# Patient Record
Sex: FEMALE | Race: WHITE | Employment: FULL TIME | URBAN - METROPOLITAN AREA
[De-identification: names, ages, dates, MRNs, and addresses within clinical notes are randomized per-mention and may not be internally consistent; named-entity substitution may affect disease eponyms.]

---

## 2017-02-06 RX ORDER — DEXTROAMPHETAMINE SACCHARATE, AMPHETAMINE ASPARTATE, DEXTROAMPHETAMINE SULFATE AND AMPHETAMINE SULFATE 5; 5; 5; 5 MG/1; MG/1; MG/1; MG/1
TABLET ORAL
Qty: 105 TABLET | Refills: 0 | Status: CANCELLED | OUTPATIENT
Start: 2017-02-06

## 2017-02-06 RX ORDER — DEXTROAMPHETAMINE SACCHARATE, AMPHETAMINE ASPARTATE, DEXTROAMPHETAMINE SULFATE AND AMPHETAMINE SULFATE 5; 5; 5; 5 MG/1; MG/1; MG/1; MG/1
TABLET ORAL
Qty: 105 TABLET | Refills: 0 | Status: SHIPPED | OUTPATIENT
Start: 2017-04-06 | End: 2017-05-03

## 2017-02-06 RX ORDER — DEXTROAMPHETAMINE SACCHARATE, AMPHETAMINE ASPARTATE, DEXTROAMPHETAMINE SULFATE AND AMPHETAMINE SULFATE 5; 5; 5; 5 MG/1; MG/1; MG/1; MG/1
TABLET ORAL
Qty: 105 TABLET | Refills: 0 | Status: SHIPPED | OUTPATIENT
Start: 2017-03-06 | End: 2017-04-05

## 2017-02-06 RX ORDER — DEXTROAMPHETAMINE SACCHARATE, AMPHETAMINE ASPARTATE, DEXTROAMPHETAMINE SULFATE AND AMPHETAMINE SULFATE 5; 5; 5; 5 MG/1; MG/1; MG/1; MG/1
TABLET ORAL
Qty: 105 TABLET | Refills: 0 | Status: SHIPPED | OUTPATIENT
Start: 2017-02-06 | End: 2017-03-05

## 2017-02-06 NOTE — TELEPHONE ENCOUNTER
From: Adri Sykes  To: Karthik Herrmann NP  Sent: 2/5/2017 11:41 AM CST  Subject: Medication Renewal Request    Original authorizing provider: AUDRA Fitzgerald would like a refill of the following medications:  amphetamine-dextroam

## 2017-02-06 NOTE — TELEPHONE ENCOUNTER
From: Jaylene Baxter  To: Meño Welch NP  Sent: 2/5/2017 11:44 AM CST  Subject: Medication Renewal Request    Original authorizing provider: AUDRA Nevarez would like a refill of the following medications:  amphetamine-dextroam

## 2017-02-08 RX ORDER — DEXTROAMPHETAMINE SACCHARATE, AMPHETAMINE ASPARTATE, DEXTROAMPHETAMINE SULFATE AND AMPHETAMINE SULFATE 5; 5; 5; 5 MG/1; MG/1; MG/1; MG/1
TABLET ORAL
Qty: 105 TABLET | Refills: 0 | Status: CANCELLED | OUTPATIENT
Start: 2017-02-08 | End: 2017-03-07

## 2017-02-10 ENCOUNTER — TELEPHONE (OUTPATIENT)
Dept: INTERNAL MEDICINE CLINIC | Facility: CLINIC | Age: 37
End: 2017-02-10

## 2017-02-10 RX ORDER — ROSUVASTATIN CALCIUM 5 MG/1
5 TABLET, COATED ORAL NIGHTLY
Qty: 90 TABLET | Refills: 0 | Status: SHIPPED | OUTPATIENT
Start: 2017-02-10 | End: 2017-05-12

## 2017-04-30 ENCOUNTER — TELEPHONE (OUTPATIENT)
Dept: INTERNAL MEDICINE CLINIC | Facility: CLINIC | Age: 37
End: 2017-04-30

## 2017-05-03 ENCOUNTER — TELEPHONE (OUTPATIENT)
Dept: INTERNAL MEDICINE CLINIC | Facility: CLINIC | Age: 37
End: 2017-05-03

## 2017-05-03 RX ORDER — DEXTROAMPHETAMINE SACCHARATE, AMPHETAMINE ASPARTATE, DEXTROAMPHETAMINE SULFATE AND AMPHETAMINE SULFATE 5; 5; 5; 5 MG/1; MG/1; MG/1; MG/1
TABLET ORAL
Qty: 105 TABLET | Refills: 0 | Status: SHIPPED | OUTPATIENT
Start: 2017-05-03 | End: 2017-06-01

## 2017-05-12 RX ORDER — ROSUVASTATIN CALCIUM 5 MG/1
5 TABLET, COATED ORAL NIGHTLY
Qty: 30 TABLET | Refills: 0 | Status: SHIPPED | OUTPATIENT
Start: 2017-05-12 | End: 2017-05-31

## 2017-05-12 NOTE — TELEPHONE ENCOUNTER
From: Pola Cosme  To: Alissa Atwood NP  Sent: 5/11/2017 10:47 PM CDT  Subject: Medication Renewal Request    Original authorizing provider: ADURA Bladeras would like a refill of the following medications:  Rosuvastatin Calciu

## 2017-05-31 ENCOUNTER — OFFICE VISIT (OUTPATIENT)
Dept: INTERNAL MEDICINE CLINIC | Facility: CLINIC | Age: 37
End: 2017-05-31

## 2017-05-31 VITALS
WEIGHT: 221.5 LBS | HEART RATE: 84 BPM | SYSTOLIC BLOOD PRESSURE: 122 MMHG | OXYGEN SATURATION: 99 % | BODY MASS INDEX: 39 KG/M2 | RESPIRATION RATE: 16 BRPM | DIASTOLIC BLOOD PRESSURE: 78 MMHG | TEMPERATURE: 99 F

## 2017-05-31 DIAGNOSIS — E78.5 HYPERLIPIDEMIA, UNSPECIFIED HYPERLIPIDEMIA TYPE: ICD-10-CM

## 2017-05-31 DIAGNOSIS — E66.9 OBESITY, UNSPECIFIED: ICD-10-CM

## 2017-05-31 DIAGNOSIS — G43.909 MIGRAINE WITHOUT STATUS MIGRAINOSUS, NOT INTRACTABLE, UNSPECIFIED MIGRAINE TYPE: ICD-10-CM

## 2017-05-31 DIAGNOSIS — E03.9 HYPOTHYROIDISM, UNSPECIFIED TYPE: Primary | ICD-10-CM

## 2017-05-31 DIAGNOSIS — F90.9 ATTENTION DEFICIT HYPERACTIVITY DISORDER (ADHD), UNSPECIFIED ADHD TYPE: ICD-10-CM

## 2017-05-31 PROCEDURE — 99214 OFFICE O/P EST MOD 30 MIN: CPT | Performed by: FAMILY MEDICINE

## 2017-05-31 RX ORDER — DEXTROAMPHETAMINE SACCHARATE, AMPHETAMINE ASPARTATE, DEXTROAMPHETAMINE SULFATE AND AMPHETAMINE SULFATE 5; 5; 5; 5 MG/1; MG/1; MG/1; MG/1
TABLET ORAL
Qty: 105 TABLET | Refills: 0 | Status: SHIPPED | OUTPATIENT
Start: 2017-05-31 | End: 2017-06-30

## 2017-05-31 RX ORDER — HYDROCODONE BITARTRATE AND ACETAMINOPHEN 7.5; 325 MG/1; MG/1
1 TABLET ORAL EVERY 6 HOURS PRN
Qty: 20 TABLET | Refills: 0 | Status: SHIPPED | OUTPATIENT
Start: 2017-05-31 | End: 2018-01-30 | Stop reason: ALTCHOICE

## 2017-05-31 RX ORDER — DEXTROAMPHETAMINE SACCHARATE, AMPHETAMINE ASPARTATE, DEXTROAMPHETAMINE SULFATE AND AMPHETAMINE SULFATE 5; 5; 5; 5 MG/1; MG/1; MG/1; MG/1
TABLET ORAL
Qty: 105 TABLET | Refills: 0 | Status: SHIPPED | OUTPATIENT
Start: 2017-07-31 | End: 2017-08-24

## 2017-05-31 RX ORDER — RIZATRIPTAN BENZOATE 10 MG/1
TABLET ORAL
Qty: 9 TABLET | Refills: 1 | Status: SHIPPED | OUTPATIENT
Start: 2017-05-31 | End: 2018-12-03

## 2017-05-31 RX ORDER — LEVOTHYROXINE SODIUM 0.12 MG/1
TABLET ORAL
Qty: 30 TABLET | Refills: 5 | Status: SHIPPED | OUTPATIENT
Start: 2017-05-31 | End: 2018-01-08 | Stop reason: DRUGHIGH

## 2017-05-31 RX ORDER — ROSUVASTATIN CALCIUM 5 MG/1
5 TABLET, COATED ORAL NIGHTLY
Qty: 30 TABLET | Refills: 5 | Status: SHIPPED | OUTPATIENT
Start: 2017-05-31 | End: 2018-07-11

## 2017-05-31 RX ORDER — DEXTROAMPHETAMINE SACCHARATE, AMPHETAMINE ASPARTATE, DEXTROAMPHETAMINE SULFATE AND AMPHETAMINE SULFATE 5; 5; 5; 5 MG/1; MG/1; MG/1; MG/1
TABLET ORAL
Qty: 105 TABLET | Refills: 0 | Status: SHIPPED | OUTPATIENT
Start: 2017-06-30 | End: 2017-07-31

## 2017-05-31 NOTE — PROGRESS NOTES
CC:       Fiona Christianson is a 39year old female who presents for recheck on  hyperlipidemia. Currently asymptomatic, no chest pains, jaw pains, arm pains. No skin lesions.   No SOB on exertion or rest. Patient denies any myalgias or arthralgias on the Rfl: 1   Rosuvastatin Calcium (CRESTOR) 5 MG Oral Tab Take 1 tablet (5 mg total) by mouth nightly.  Disp: 30 tablet Rfl: 5   Levothyroxine Sodium 125 MCG Oral Tab TAKE 1 TABLET(125 MCG) BY MOUTH DAILY BEFORE BREAKFAST Disp: 30 tablet Rfl: 5   amphetamine-de enlarged. LUNGS: clear to auscultation  CARDIO: RRR without murmur  VS:no edema, peripheral pulses normal  GI: good BS's,no masses, HSM or tenderness  SKIN: no rashes,no suspicious lesions  EXTREMITIES: no cyanosis, clubbing   NEURO: Alert & Oriented x 3. Tab; Take one at the onset of your headache and repeat in 2 hours if needed. Dispense: 9 tablet; Refill: 1    5. Obesity  Pt referred to the weight loss clinic. Pt to continue to diet, exercise.     The patient indicates understanding of these issues and

## 2017-08-17 ENCOUNTER — PATIENT MESSAGE (OUTPATIENT)
Dept: INTERNAL MEDICINE CLINIC | Facility: CLINIC | Age: 37
End: 2017-08-17

## 2017-08-17 ENCOUNTER — TELEPHONE (OUTPATIENT)
Dept: INTERNAL MEDICINE CLINIC | Facility: CLINIC | Age: 37
End: 2017-08-17

## 2017-08-17 DIAGNOSIS — R79.89 ELEVATED TSH: Primary | ICD-10-CM

## 2017-08-17 RX ORDER — ALPRAZOLAM 0.25 MG/1
TABLET ORAL
Qty: 10 TABLET | Refills: 0 | Status: SHIPPED | OUTPATIENT
Start: 2017-08-17 | End: 2018-01-12

## 2017-08-17 NOTE — TELEPHONE ENCOUNTER
From: Marley Mg  To: Matty Up NP  Sent: 8/17/2017 10:24 AM CDT  Subject: Prescription Question    Can I get a refill on the generic Xanax? I am flying this Saturday and thought I had a couple left from the last RX but I do not. Thank you.

## 2017-08-17 NOTE — TELEPHONE ENCOUNTER
Labs reviewed that were drawn from an out side lab. TSH up. Pt needs to increase her Levothyroxine to 137 mcg one daily. #30 with one refill. Recheck TSH in 6-8 weeks.

## 2017-08-18 RX ORDER — LEVOTHYROXINE SODIUM 137 UG/1
137 TABLET ORAL
Qty: 30 TABLET | Refills: 1 | Status: SHIPPED | OUTPATIENT
Start: 2017-08-18 | End: 2017-11-09

## 2017-08-24 DIAGNOSIS — F90.9 ATTENTION DEFICIT HYPERACTIVITY DISORDER (ADHD), UNSPECIFIED ADHD TYPE: ICD-10-CM

## 2017-08-24 RX ORDER — DEXTROAMPHETAMINE SACCHARATE, AMPHETAMINE ASPARTATE, DEXTROAMPHETAMINE SULFATE AND AMPHETAMINE SULFATE 5; 5; 5; 5 MG/1; MG/1; MG/1; MG/1
TABLET ORAL
Qty: 105 TABLET | Refills: 0
Start: 2017-08-24 | End: 2017-08-24

## 2017-08-24 RX ORDER — DEXTROAMPHETAMINE SACCHARATE, AMPHETAMINE ASPARTATE, DEXTROAMPHETAMINE SULFATE AND AMPHETAMINE SULFATE 5; 5; 5; 5 MG/1; MG/1; MG/1; MG/1
TABLET ORAL
Qty: 105 TABLET | Refills: 0 | Status: SHIPPED | OUTPATIENT
Start: 2017-08-24 | End: 2017-09-12

## 2017-08-24 NOTE — TELEPHONE ENCOUNTER
From: Sahara Serna  Sent: 8/24/2017 1:26 PM CDT  Subject: Medication Renewal Request    Sahara Serna would like a refill of the following medications:  amphetamine-dextroamphetamine (ADDERALL) 20 MG Oral Tab Marylee Schaumann, NP]    Preferred pharm

## 2017-09-12 DIAGNOSIS — F90.9 ATTENTION DEFICIT HYPERACTIVITY DISORDER (ADHD), UNSPECIFIED ADHD TYPE: ICD-10-CM

## 2017-09-12 NOTE — TELEPHONE ENCOUNTER
From: Sahara Serna  Sent: 9/12/2017 2:33 PM CDT  Subject: Medication Renewal Request    Sahara Serna would like a refill of the following medications:  amphetamine-dextroamphetamine (ADDERALL) 20 MG Oral Tab Violet Mcdowell MD]    Preferred pharm

## 2017-09-13 ENCOUNTER — TELEPHONE (OUTPATIENT)
Dept: INTERNAL MEDICINE CLINIC | Facility: CLINIC | Age: 37
End: 2017-09-13

## 2017-09-13 RX ORDER — DEXTROAMPHETAMINE SACCHARATE, AMPHETAMINE ASPARTATE, DEXTROAMPHETAMINE SULFATE AND AMPHETAMINE SULFATE 5; 5; 5; 5 MG/1; MG/1; MG/1; MG/1
TABLET ORAL
Qty: 105 TABLET | Refills: 0 | Status: SHIPPED | OUTPATIENT
Start: 2017-10-13 | End: 2017-11-12

## 2017-09-13 RX ORDER — DEXTROAMPHETAMINE SACCHARATE, AMPHETAMINE ASPARTATE, DEXTROAMPHETAMINE SULFATE AND AMPHETAMINE SULFATE 5; 5; 5; 5 MG/1; MG/1; MG/1; MG/1
TABLET ORAL
Qty: 105 TABLET | Refills: 0 | Status: SHIPPED | OUTPATIENT
Start: 2017-11-12 | End: 2017-12-12

## 2017-09-13 RX ORDER — DEXTROAMPHETAMINE SACCHARATE, AMPHETAMINE ASPARTATE, DEXTROAMPHETAMINE SULFATE AND AMPHETAMINE SULFATE 5; 5; 5; 5 MG/1; MG/1; MG/1; MG/1
TABLET ORAL
Qty: 105 TABLET | Refills: 0 | Status: SHIPPED | OUTPATIENT
Start: 2017-09-13 | End: 2017-10-13

## 2017-09-13 NOTE — TELEPHONE ENCOUNTER
Called pt to inform prescription for Adderall was approved for one month. Pt requested 3 prescriptions, as she is due to come back until November 2017. Would you approve the 3 months? Please advise.

## 2017-11-09 DIAGNOSIS — R79.89 ELEVATED TSH: ICD-10-CM

## 2017-11-09 RX ORDER — LEVOTHYROXINE SODIUM 137 UG/1
137 TABLET ORAL
Qty: 30 TABLET | Refills: 0
Start: 2017-11-09 | End: 2017-11-16

## 2017-11-09 NOTE — TELEPHONE ENCOUNTER
Requesting levothyroxine  LOV: 5/31/17  RTC: 8/31/17  Last Relevant Labs: 8/18/17  Filled: 8/18/17 #30 with 1 refills    No future appointments.

## 2017-11-11 DIAGNOSIS — R79.89 ELEVATED TSH: ICD-10-CM

## 2017-11-14 DIAGNOSIS — R79.89 ELEVATED TSH: ICD-10-CM

## 2017-11-14 RX ORDER — LEVOTHYROXINE SODIUM 137 UG/1
137 TABLET ORAL
Qty: 30 TABLET | Refills: 0 | Status: CANCELLED
Start: 2017-11-14

## 2017-11-16 DIAGNOSIS — R79.89 ELEVATED TSH: ICD-10-CM

## 2017-11-16 RX ORDER — LEVOTHYROXINE SODIUM 137 UG/1
137 TABLET ORAL
Qty: 30 TABLET | Refills: 0
Start: 2017-11-16

## 2017-11-16 RX ORDER — LEVOTHYROXINE SODIUM 137 UG/1
137 TABLET ORAL
Qty: 30 TABLET | Refills: 0 | Status: SHIPPED | OUTPATIENT
Start: 2017-11-16 | End: 2017-11-16

## 2017-11-16 NOTE — TELEPHONE ENCOUNTER
From: Sahara Serna  Sent: 11/11/2017 3:01 PM CST  Subject: Medication Renewal Request    Sahara Serna would like a refill of the following medications:     Levothyroxine Sodium 137 MCG Oral Tab Marylee Schaumann, NP]    [facility]

## 2017-11-16 NOTE — TELEPHONE ENCOUNTER
From: James Horton  Sent: 11/14/2017 4:43 PM CST  Subject: Medication Renewal Request    James Horton would like a refill of the following medications:     Levothyroxine Sodium 137 MCG Oral Tab Brain AUDRA Alcocer]   Patient Comment: The pharmacy

## 2017-11-18 RX ORDER — LEVOTHYROXINE SODIUM 137 UG/1
137 TABLET ORAL
Qty: 15 TABLET | Refills: 0 | Status: SHIPPED | OUTPATIENT
Start: 2017-11-18 | End: 2018-01-08

## 2017-12-18 ENCOUNTER — TELEPHONE (OUTPATIENT)
Dept: INTERNAL MEDICINE CLINIC | Facility: CLINIC | Age: 37
End: 2017-12-18

## 2017-12-18 RX ORDER — DEXTROAMPHETAMINE SACCHARATE, AMPHETAMINE ASPARTATE, DEXTROAMPHETAMINE SULFATE AND AMPHETAMINE SULFATE 5; 5; 5; 5 MG/1; MG/1; MG/1; MG/1
TABLET ORAL
Qty: 105 TABLET | Refills: 0
Start: 2017-12-18 | End: 2017-12-18

## 2017-12-18 RX ORDER — DEXTROAMPHETAMINE SACCHARATE, AMPHETAMINE ASPARTATE, DEXTROAMPHETAMINE SULFATE AND AMPHETAMINE SULFATE 5; 5; 5; 5 MG/1; MG/1; MG/1; MG/1
TABLET ORAL
Qty: 105 TABLET | Refills: 0 | Status: SHIPPED | OUTPATIENT
Start: 2017-12-18 | End: 2019-03-07 | Stop reason: ALTCHOICE

## 2017-12-18 RX ORDER — DEXTROAMPHETAMINE SACCHARATE, AMPHETAMINE ASPARTATE, DEXTROAMPHETAMINE SULFATE AND AMPHETAMINE SULFATE 5; 5; 5; 5 MG/1; MG/1; MG/1; MG/1
TABLET ORAL
Qty: 105 TABLET | Refills: 0 | Status: CANCELLED
Start: 2017-12-18

## 2017-12-21 NOTE — TELEPHONE ENCOUNTER
From: Dilia Huynh  Sent: 12/18/2017 4:47 PM CST  Subject: Medication Renewal Request    Dilia Huynh would like a refill of the following medications:     amphetamine-dextroamphetamine 20 MG Oral Tab Jad Smallwood NP]    Preferred pharmacy:  [facility]

## 2018-01-08 ENCOUNTER — OFFICE VISIT (OUTPATIENT)
Dept: INTERNAL MEDICINE CLINIC | Facility: CLINIC | Age: 38
End: 2018-01-08

## 2018-01-08 VITALS
RESPIRATION RATE: 16 BRPM | OXYGEN SATURATION: 99 % | TEMPERATURE: 99 F | BODY MASS INDEX: 39.97 KG/M2 | HEIGHT: 62.25 IN | DIASTOLIC BLOOD PRESSURE: 66 MMHG | SYSTOLIC BLOOD PRESSURE: 98 MMHG | HEART RATE: 75 BPM | WEIGHT: 220 LBS

## 2018-01-08 DIAGNOSIS — F98.8 ATTENTION DEFICIT DISORDER (ADD) WITHOUT HYPERACTIVITY: ICD-10-CM

## 2018-01-08 DIAGNOSIS — E03.9 HYPOTHYROIDISM, UNSPECIFIED TYPE: Primary | ICD-10-CM

## 2018-01-08 DIAGNOSIS — E78.5 HYPERLIPIDEMIA, UNSPECIFIED HYPERLIPIDEMIA TYPE: ICD-10-CM

## 2018-01-08 PROCEDURE — 99214 OFFICE O/P EST MOD 30 MIN: CPT | Performed by: FAMILY MEDICINE

## 2018-01-08 RX ORDER — DEXTROAMPHETAMINE SACCHARATE, AMPHETAMINE ASPARTATE, DEXTROAMPHETAMINE SULFATE AND AMPHETAMINE SULFATE 5; 5; 5; 5 MG/1; MG/1; MG/1; MG/1
TABLET ORAL
Qty: 105 TABLET | Refills: 0 | Status: SHIPPED | OUTPATIENT
Start: 2018-02-08 | End: 2018-01-30

## 2018-01-08 RX ORDER — DEXTROAMPHETAMINE SACCHARATE, AMPHETAMINE ASPARTATE, DEXTROAMPHETAMINE SULFATE AND AMPHETAMINE SULFATE 5; 5; 5; 5 MG/1; MG/1; MG/1; MG/1
TABLET ORAL
Qty: 105 TABLET | Refills: 0 | Status: SHIPPED | OUTPATIENT
Start: 2018-03-08 | End: 2018-01-30

## 2018-01-08 RX ORDER — DEXTROAMPHETAMINE SACCHARATE, AMPHETAMINE ASPARTATE, DEXTROAMPHETAMINE SULFATE AND AMPHETAMINE SULFATE 5; 5; 5; 5 MG/1; MG/1; MG/1; MG/1
TABLET ORAL
Qty: 105 TABLET | Refills: 0 | Status: SHIPPED | OUTPATIENT
Start: 2018-01-08 | End: 2018-01-30

## 2018-01-08 RX ORDER — LEVOTHYROXINE SODIUM 137 UG/1
137 TABLET ORAL
Qty: 30 TABLET | Refills: 3 | Status: SHIPPED | OUTPATIENT
Start: 2018-01-08 | End: 2018-07-11

## 2018-01-08 NOTE — PROGRESS NOTES
CHIEF COMPLAINT:     Patient presents with:  Medication Follow-Up      HPI:   Thuy Angela is a 44year old female . Patient returns for recheck of ADHD treatment. Has been using the stimulant medication on a regular basis.   Feels the medication ha (CRESTOR) 5 MG Oral Tab Take 1 tablet (5 mg total) by mouth nightly. Disp: 30 tablet Rfl: 5   Levonorgestrel (MIRENA) 20 MCG/24HR Intrauterine IUD 1 Each by Intrauterine route once.  Disp:  Rfl:       Past Medical History:   Diagnosis Date   • General couns unspecified hyperlipidemia type    No orders of the defined types were placed in this encounter.       Meds & Refills for this Visit:  Signed Prescriptions Disp Refills    Levothyroxine Sodium 137 MCG Oral Tab 30 tablet 3      Sig: Take 137 mcg by mouth bef

## 2018-01-11 ENCOUNTER — TELEPHONE (OUTPATIENT)
Dept: OBGYN CLINIC | Facility: CLINIC | Age: 38
End: 2018-01-11

## 2018-01-12 ENCOUNTER — PATIENT MESSAGE (OUTPATIENT)
Dept: INTERNAL MEDICINE CLINIC | Facility: CLINIC | Age: 38
End: 2018-01-12

## 2018-01-12 NOTE — TELEPHONE ENCOUNTER
Faxed script for Alprazolam #20 to [facility] [phone], patient informed via my chart

## 2018-01-12 NOTE — TELEPHONE ENCOUNTER
From: Rosa Jason  To: Brandy El NP  Sent: 1/12/2018 9:53 AM CST  Subject: Prescription Question    I am wondering if you could prescribe me Xanax for the night before and morning of my surgery that is Tuesday 1/16/18?  I feel like I'm getting th

## 2018-01-30 ENCOUNTER — OFFICE VISIT (OUTPATIENT)
Dept: OBGYN CLINIC | Facility: CLINIC | Age: 38
End: 2018-01-30

## 2018-01-30 VITALS
WEIGHT: 206.5 LBS | HEIGHT: 61.5 IN | DIASTOLIC BLOOD PRESSURE: 60 MMHG | BODY MASS INDEX: 38.49 KG/M2 | RESPIRATION RATE: 16 BRPM | TEMPERATURE: 98 F | SYSTOLIC BLOOD PRESSURE: 102 MMHG | HEART RATE: 78 BPM

## 2018-01-30 DIAGNOSIS — Z30.09 COUNSELING FOR BIRTH CONTROL REGARDING INTRAUTERINE DEVICE (IUD): ICD-10-CM

## 2018-01-30 DIAGNOSIS — N84.1 CERVICAL POLYP: ICD-10-CM

## 2018-01-30 DIAGNOSIS — Z01.419 WELL WOMAN EXAM WITH ROUTINE GYNECOLOGICAL EXAM: Primary | ICD-10-CM

## 2018-01-30 DIAGNOSIS — Z12.4 CERVICAL CANCER SCREENING: ICD-10-CM

## 2018-01-30 PROCEDURE — 88175 CYTOPATH C/V AUTO FLUID REDO: CPT | Performed by: NURSE PRACTITIONER

## 2018-01-30 PROCEDURE — 99385 PREV VISIT NEW AGE 18-39: CPT | Performed by: NURSE PRACTITIONER

## 2018-01-30 PROCEDURE — 87624 HPV HI-RISK TYP POOLED RSLT: CPT | Performed by: NURSE PRACTITIONER

## 2018-01-30 NOTE — PROGRESS NOTES
Here for new gynecology visit. 44year old G 3 P 3. Patient's last menstrual period was 01/20/2018. Here for Annual Gynecologic Exam.     Menses are irregular and often no menses with Mirena IUD in place. Due to have Mirena replaced.     Last pap smea SPONT      2 Term 07/2006 40w0d  7 lb 3 oz F NORMAL SPONT      1 Term 06/1999 40w0d  6 lb 8 oz F NORMAL SPONT             ROS:    General:  No wt gain, wt gain, appetite changes. Eyes:  No vision changes. Ears:  No pain, hearing problems.   Throat:  No so

## 2018-01-30 NOTE — TELEPHONE ENCOUNTER
Medical records reviewed going back to 2004 annual exam and 2 pregnancies. Last pap was normal and negative HPV in 5/2016 which was with her last visit/ annual exam. Records will be sent to scan.

## 2018-02-02 LAB — HPV I/H RISK 1 DNA SPEC QL NAA+PROBE: NEGATIVE

## 2018-03-08 ENCOUNTER — OFFICE VISIT (OUTPATIENT)
Dept: OBGYN CLINIC | Facility: CLINIC | Age: 38
End: 2018-03-08

## 2018-03-08 VITALS
BODY MASS INDEX: 35.35 KG/M2 | HEIGHT: 62.5 IN | WEIGHT: 197 LBS | DIASTOLIC BLOOD PRESSURE: 62 MMHG | SYSTOLIC BLOOD PRESSURE: 104 MMHG | HEART RATE: 79 BPM

## 2018-03-08 DIAGNOSIS — Z30.433 ENCOUNTER FOR REMOVAL AND REINSERTION OF IUD: ICD-10-CM

## 2018-03-08 DIAGNOSIS — N84.1 CERVICAL POLYP: ICD-10-CM

## 2018-03-08 DIAGNOSIS — Z32.02 URINE PREGNANCY TEST NEGATIVE: Primary | ICD-10-CM

## 2018-03-08 LAB — CONTROL LINE PRESENT WITH A CLEAR BACKGROUND (YES/NO): YES YES/NO

## 2018-03-08 PROCEDURE — 58300 INSERT INTRAUTERINE DEVICE: CPT | Performed by: NURSE PRACTITIONER

## 2018-03-08 PROCEDURE — 57500 BIOPSY OF CERVIX: CPT | Performed by: NURSE PRACTITIONER

## 2018-03-08 PROCEDURE — 88305 TISSUE EXAM BY PATHOLOGIST: CPT | Performed by: NURSE PRACTITIONER

## 2018-03-08 PROCEDURE — 81025 URINE PREGNANCY TEST: CPT | Performed by: NURSE PRACTITIONER

## 2018-03-08 PROCEDURE — 58301 REMOVE INTRAUTERINE DEVICE: CPT | Performed by: NURSE PRACTITIONER

## 2018-03-08 NOTE — PROGRESS NOTES
Procedure: The patient was consented for Mirena removal and new device placement as well as a cervical polypectomy. Risks and benefits were discussed including infection, uterine perforation, uterine expulsion, PID, ectopic and intrauterine pregnancy.

## 2018-04-03 RX ORDER — DEXTROAMPHETAMINE SACCHARATE, AMPHETAMINE ASPARTATE, DEXTROAMPHETAMINE SULFATE AND AMPHETAMINE SULFATE 5; 5; 5; 5 MG/1; MG/1; MG/1; MG/1
TABLET ORAL
Qty: 105 TABLET | Refills: 0 | Status: CANCELLED
Start: 2018-04-03

## 2018-04-03 RX ORDER — DEXTROAMPHETAMINE SACCHARATE, AMPHETAMINE ASPARTATE, DEXTROAMPHETAMINE SULFATE AND AMPHETAMINE SULFATE 5; 5; 5; 5 MG/1; MG/1; MG/1; MG/1
TABLET ORAL
Qty: 105 TABLET | Refills: 0 | Status: SHIPPED | OUTPATIENT
Start: 2018-05-03 | End: 2018-06-03

## 2018-04-03 RX ORDER — DEXTROAMPHETAMINE SACCHARATE, AMPHETAMINE ASPARTATE, DEXTROAMPHETAMINE SULFATE AND AMPHETAMINE SULFATE 5; 5; 5; 5 MG/1; MG/1; MG/1; MG/1
TABLET ORAL
Qty: 105 TABLET | Refills: 0 | Status: SHIPPED | OUTPATIENT
Start: 2018-06-03 | End: 2018-07-03

## 2018-04-03 RX ORDER — ALPRAZOLAM 0.25 MG/1
TABLET ORAL
Qty: 20 TABLET | Refills: 0 | Status: SHIPPED | OUTPATIENT
Start: 2018-04-03 | End: 2018-06-06

## 2018-04-03 RX ORDER — ALPRAZOLAM 0.25 MG/1
TABLET ORAL
Qty: 20 TABLET | Refills: 0
Start: 2018-04-03 | End: 2018-04-03 | Stop reason: CLARIF

## 2018-04-03 RX ORDER — DEXTROAMPHETAMINE SACCHARATE, AMPHETAMINE ASPARTATE, DEXTROAMPHETAMINE SULFATE AND AMPHETAMINE SULFATE 5; 5; 5; 5 MG/1; MG/1; MG/1; MG/1
TABLET ORAL
Qty: 105 TABLET | Refills: 0 | Status: SHIPPED | OUTPATIENT
Start: 2018-04-03 | End: 2018-07-19

## 2018-04-03 NOTE — TELEPHONE ENCOUNTER
From: Odie Holstein  Sent: 4/3/2018 2:36 PM CDT  Subject: Medication Renewal Request    Odie Holstein would like a refill of the following medications:     amphetamine-dextroamphetamine 20 MG Oral Tab Raymond Jamil NP]   Patient Comment: 3 months

## 2018-04-03 NOTE — TELEPHONE ENCOUNTER
From: Gregory Zhao  Sent: 4/3/2018 2:36 PM CDT  Subject: Medication Renewal Request    Gregory Zhao would like a refill of the following medications:     ALPRAZolam Palma Yousif) 0.25 MG Oral Sai Sheth MD]   Patient Comment: Flying to [facility]

## 2018-06-07 RX ORDER — ALPRAZOLAM 0.25 MG/1
TABLET ORAL
Qty: 20 TABLET | Refills: 0 | Status: SHIPPED | OUTPATIENT
Start: 2018-06-07 | End: 2019-01-08

## 2018-06-07 NOTE — TELEPHONE ENCOUNTER
Faxed Rx script (Alprazolam 0.25mg) to [address] [place] [address] ([facility] #[phone]).

## 2018-06-07 NOTE — TELEPHONE ENCOUNTER
From: Jono Stroud  Sent: 6/6/2018 11:05 PM CDT  Subject: Medication Renewal Request    Jono Stroud would like a refill of the following medications:     ALPRAZolam Cartermercy Gimenez) 0.25 MG Oral Tab Penny Green NP]   Patient Comment: Flying-going on

## 2018-07-11 DIAGNOSIS — E03.9 HYPOTHYROIDISM, UNSPECIFIED TYPE: ICD-10-CM

## 2018-07-11 DIAGNOSIS — E78.5 HYPERLIPIDEMIA, UNSPECIFIED HYPERLIPIDEMIA TYPE: ICD-10-CM

## 2018-07-11 NOTE — TELEPHONE ENCOUNTER
From: Treutlen Media  Sent: 7/11/2018 3:52 PM CDT  Subject: Medication Renewal Request    [facility] would like a refill of the following medications:     amphetamine-dextroamphetamine 20 MG Oral Tab Zainab Rogers NP]   Patient Comment: 3- 1 mo

## 2018-07-11 NOTE — TELEPHONE ENCOUNTER
From: Wolfe Media  Sent: 7/11/2018 3:52 PM CDT  Subject: Medication Renewal Request    Angelina Campos would like a refill of the following medications:     Rosuvastatin Calcium (CRESTOR) 5 MG Oral Tab Zainab Rogers NP]     Levothyroxine Sodium 1

## 2018-07-12 RX ORDER — LEVOTHYROXINE SODIUM 137 UG/1
137 TABLET ORAL
Qty: 30 TABLET | Refills: 3 | Status: SHIPPED | OUTPATIENT
Start: 2018-07-12 | End: 2018-07-19 | Stop reason: DRUGHIGH

## 2018-07-12 RX ORDER — ROSUVASTATIN CALCIUM 5 MG/1
5 TABLET, COATED ORAL NIGHTLY
Qty: 30 TABLET | Refills: 3 | Status: SHIPPED | OUTPATIENT
Start: 2018-07-12 | End: 2018-07-19

## 2018-07-12 RX ORDER — DEXTROAMPHETAMINE SACCHARATE, AMPHETAMINE ASPARTATE, DEXTROAMPHETAMINE SULFATE AND AMPHETAMINE SULFATE 5; 5; 5; 5 MG/1; MG/1; MG/1; MG/1
TABLET ORAL
Qty: 105 TABLET | Refills: 0 | OUTPATIENT
Start: 2018-07-12

## 2018-07-13 NOTE — TELEPHONE ENCOUNTER
From: Thuy Angela  Sent: 7/12/2018 5:55 PM CDT  Subject: Medication Renewal Request    Thuy Angela would like a refill of the following medications:     amphetamine-dextroamphetamine 20 MG Oral Tab Pascual Rojas NP]    [facility]

## 2018-07-16 ENCOUNTER — PATIENT MESSAGE (OUTPATIENT)
Dept: INTERNAL MEDICINE CLINIC | Facility: CLINIC | Age: 38
End: 2018-07-16

## 2018-07-16 RX ORDER — DEXTROAMPHETAMINE SACCHARATE, AMPHETAMINE ASPARTATE, DEXTROAMPHETAMINE SULFATE AND AMPHETAMINE SULFATE 5; 5; 5; 5 MG/1; MG/1; MG/1; MG/1
TABLET ORAL
Qty: 105 TABLET | Refills: 0 | OUTPATIENT
Start: 2018-07-16

## 2018-07-19 ENCOUNTER — PATIENT MESSAGE (OUTPATIENT)
Dept: INTERNAL MEDICINE CLINIC | Facility: CLINIC | Age: 38
End: 2018-07-19

## 2018-07-19 ENCOUNTER — OFFICE VISIT (OUTPATIENT)
Dept: INTERNAL MEDICINE CLINIC | Facility: CLINIC | Age: 38
End: 2018-07-19
Payer: COMMERCIAL

## 2018-07-19 VITALS
TEMPERATURE: 99 F | RESPIRATION RATE: 16 BRPM | WEIGHT: 175 LBS | OXYGEN SATURATION: 99 % | BODY MASS INDEX: 32 KG/M2 | SYSTOLIC BLOOD PRESSURE: 94 MMHG | DIASTOLIC BLOOD PRESSURE: 58 MMHG | HEART RATE: 84 BPM

## 2018-07-19 DIAGNOSIS — E03.9 HYPOTHYROIDISM, UNSPECIFIED TYPE: Primary | ICD-10-CM

## 2018-07-19 DIAGNOSIS — E78.5 HYPERLIPIDEMIA, UNSPECIFIED HYPERLIPIDEMIA TYPE: ICD-10-CM

## 2018-07-19 DIAGNOSIS — F98.8 ATTENTION DEFICIT DISORDER (ADD) WITHOUT HYPERACTIVITY: ICD-10-CM

## 2018-07-19 PROCEDURE — 99214 OFFICE O/P EST MOD 30 MIN: CPT | Performed by: FAMILY MEDICINE

## 2018-07-19 RX ORDER — DEXTROAMPHETAMINE SACCHARATE, AMPHETAMINE ASPARTATE, DEXTROAMPHETAMINE SULFATE AND AMPHETAMINE SULFATE 5; 5; 5; 5 MG/1; MG/1; MG/1; MG/1
TABLET ORAL
Qty: 120 TABLET | Refills: 0 | Status: SHIPPED | OUTPATIENT
Start: 2018-08-19 | End: 2018-09-19

## 2018-07-19 RX ORDER — DEXTROAMPHETAMINE SACCHARATE, AMPHETAMINE ASPARTATE, DEXTROAMPHETAMINE SULFATE AND AMPHETAMINE SULFATE 5; 5; 5; 5 MG/1; MG/1; MG/1; MG/1
TABLET ORAL
Qty: 120 TABLET | Refills: 0 | Status: SHIPPED | OUTPATIENT
Start: 2018-07-19 | End: 2018-08-19

## 2018-07-19 RX ORDER — PANTOPRAZOLE SODIUM 40 MG/1
1 TABLET, DELAYED RELEASE ORAL 2 TIMES DAILY
Refills: 0 | COMMUNITY
Start: 2018-07-12 | End: 2020-02-10

## 2018-07-19 RX ORDER — DEXTROAMPHETAMINE SACCHARATE, AMPHETAMINE ASPARTATE, DEXTROAMPHETAMINE SULFATE AND AMPHETAMINE SULFATE 5; 5; 5; 5 MG/1; MG/1; MG/1; MG/1
TABLET ORAL
Qty: 105 TABLET | Refills: 0 | Status: SHIPPED | OUTPATIENT
Start: 2018-07-19 | End: 2018-08-18

## 2018-07-19 RX ORDER — DEXTROAMPHETAMINE SACCHARATE, AMPHETAMINE ASPARTATE, DEXTROAMPHETAMINE SULFATE AND AMPHETAMINE SULFATE 5; 5; 5; 5 MG/1; MG/1; MG/1; MG/1
TABLET ORAL
Qty: 120 TABLET | Refills: 0 | Status: SHIPPED | OUTPATIENT
Start: 2018-09-19 | End: 2018-10-11

## 2018-07-19 RX ORDER — LEVOTHYROXINE SODIUM 0.12 MG/1
125 TABLET ORAL
Qty: 30 TABLET | Refills: 1 | Status: SHIPPED | OUTPATIENT
Start: 2018-07-19 | End: 2018-09-12

## 2018-07-19 RX ORDER — ROSUVASTATIN CALCIUM 5 MG/1
5 TABLET, COATED ORAL DAILY
Qty: 30 TABLET | Refills: 3 | Status: SHIPPED | OUTPATIENT
Start: 2018-07-19 | End: 2018-12-03

## 2018-07-19 NOTE — TELEPHONE ENCOUNTER
From: Shaye White  To: Sven Cruz NP  Sent: 7/19/2018 1:55 PM CDT  Subject: Prescription Question    Am I able to get a written RX of the older dose of Adderall until the prior auth gets approved because I know it can take sometime before it's ap

## 2018-07-19 NOTE — PROGRESS NOTES
Patient presents with:  Medication Follow-Up      HPI:  Patient returns for recheck of ADHD treatment. Has been using the stimulant medication on a regular basis. Feels the medication has been helping improve focus.    Pt has been taking two in the PM a Negative for chest pain, palpitations  Gastrointestinal: Negative for nausea, vomiting, abdominal pain  Genitourinary: Negative for dysuria, hematuria and difficulty urinating. Musculoskeletal: Negative for back pain, joint swelling, and gait problem.  Se of your headache and repeat in 2 hours if needed.  Disp: 9 tablet Rfl: 1       Physical Exam  BP 94/58 (BP Location: Right arm, Patient Position: Sitting, Cuff Size: large)   Pulse 84   Temp 98.8 °F (37.1 °C) (Oral)   Resp 16   Wt 175 lb   LMP 06/24/2018 (A Sig: Take 2 tablets twice a day      amphetamine-dextroamphetamine (ADDERALL) 20 MG Oral Tab 120 tablet 0      Sig: Take 2 tablets twice a day      amphetamine-dextroamphetamine (ADDERALL) 20 MG Oral Tab 120 tablet 0      Sig: Take two tablet twice a day

## 2018-08-17 ENCOUNTER — TELEPHONE (OUTPATIENT)
Dept: INTERNAL MEDICINE CLINIC | Facility: CLINIC | Age: 38
End: 2018-08-17

## 2018-08-17 NOTE — TELEPHONE ENCOUNTER
Spoke with pt and she just spoke with pharmacy to run rx thru insurance requires PA the $75.00 is self ap with a coupon she is picking up today.  Will contact insurance again Does not need another rx just needs the medication authorized for next month

## 2018-08-17 NOTE — TELEPHONE ENCOUNTER
Patient needs a refill for generic adderal, states she's having problem with the pharmacy and would like to speak to the nurse

## 2018-08-22 NOTE — TELEPHONE ENCOUNTER
PA is still under clinical review we do not have an answer.     Phone # to check on the pa is [phone]    Spoke with Zenobia Hanna at cover my meds     Pt aware and will call back on Monday to check on the status of the PA

## 2018-09-04 NOTE — TELEPHONE ENCOUNTER
PA approval letter received from [address] [place] [address],[address]. Medication Approved : Amphetamine- Dextroamphetamine 20 mg tablet      Quantity : 120 tabs/month    Case # : [id number]    Approved: 8/17/2018 through 2/17/2019.

## 2018-09-12 DIAGNOSIS — E03.9 HYPOTHYROIDISM, UNSPECIFIED TYPE: ICD-10-CM

## 2018-09-13 RX ORDER — LEVOTHYROXINE SODIUM 0.12 MG/1
TABLET ORAL
Qty: 30 TABLET | Refills: 0 | Status: SHIPPED | OUTPATIENT
Start: 2018-09-13 | End: 2018-11-05

## 2018-10-09 DIAGNOSIS — E03.9 HYPOTHYROIDISM, UNSPECIFIED TYPE: ICD-10-CM

## 2018-10-10 RX ORDER — LEVOTHYROXINE SODIUM 0.12 MG/1
TABLET ORAL
Qty: 30 TABLET | Refills: 0 | OUTPATIENT
Start: 2018-10-10

## 2018-10-11 ENCOUNTER — TELEPHONE (OUTPATIENT)
Dept: INTERNAL MEDICINE CLINIC | Facility: CLINIC | Age: 38
End: 2018-10-11

## 2018-10-11 DIAGNOSIS — F98.8 ATTENTION DEFICIT DISORDER (ADD) WITHOUT HYPERACTIVITY: ICD-10-CM

## 2018-10-11 RX ORDER — DEXTROAMPHETAMINE SACCHARATE, AMPHETAMINE ASPARTATE, DEXTROAMPHETAMINE SULFATE AND AMPHETAMINE SULFATE 5; 5; 5; 5 MG/1; MG/1; MG/1; MG/1
TABLET ORAL
Qty: 120 TABLET | Refills: 0 | Status: SHIPPED | OUTPATIENT
Start: 2018-10-11 | End: 2018-11-06

## 2018-10-11 NOTE — TELEPHONE ENCOUNTER
Patient calling in requesting a refill for amphetamine-dextroamphetamine (ADDERALL) 20 MG Oral Tab    Pt stated she is aware she is one day early, however she will be going out of town today and will need her RX. Please call pt to discuss.

## 2018-10-11 NOTE — TELEPHONE ENCOUNTER
Spoke with patient and she had original Rx and took it to pharmacy. The pharmacy wanted us to call and authorize the refill since it was a day early. Spoke with [facility] pharmacy and Rx is being processed.   Rx printed today has been shredded

## 2018-10-23 ENCOUNTER — MED REC SCAN ONLY (OUTPATIENT)
Dept: INTERNAL MEDICINE CLINIC | Facility: CLINIC | Age: 38
End: 2018-10-23

## 2018-11-04 DIAGNOSIS — E78.5 HYPERLIPIDEMIA, UNSPECIFIED HYPERLIPIDEMIA TYPE: ICD-10-CM

## 2018-11-05 DIAGNOSIS — E03.9 HYPOTHYROIDISM, UNSPECIFIED TYPE: ICD-10-CM

## 2018-11-05 RX ORDER — ROSUVASTATIN CALCIUM 5 MG/1
TABLET, COATED ORAL
Qty: 30 TABLET | Refills: 5 | Status: SHIPPED | OUTPATIENT
Start: 2018-11-05 | End: 2019-12-16

## 2018-11-06 DIAGNOSIS — F98.8 ATTENTION DEFICIT DISORDER (ADD) WITHOUT HYPERACTIVITY: ICD-10-CM

## 2018-11-06 RX ORDER — LEVOTHYROXINE SODIUM 0.12 MG/1
TABLET ORAL
Qty: 30 TABLET | Refills: 0 | Status: SHIPPED | OUTPATIENT
Start: 2018-11-06 | End: 2018-12-03

## 2018-11-06 NOTE — TELEPHONE ENCOUNTER
Refill requested:   Requested Prescriptions     Pending Prescriptions Disp Refills   • LEVOTHYROXINE SODIUM 125 MCG Oral Tab [Pharmacy Med Name: LEVOTHYROXINE 0.125MG (125MCG) TAB] 30 tablet 0     Sig: TAKE 1 TABLET BY MOUTH BEFORE BREAKFAST         Passed

## 2018-11-07 DIAGNOSIS — F98.8 ATTENTION DEFICIT DISORDER (ADD) WITHOUT HYPERACTIVITY: ICD-10-CM

## 2018-11-07 RX ORDER — DEXTROAMPHETAMINE SACCHARATE, AMPHETAMINE ASPARTATE, DEXTROAMPHETAMINE SULFATE AND AMPHETAMINE SULFATE 5; 5; 5; 5 MG/1; MG/1; MG/1; MG/1
TABLET ORAL
Qty: 120 TABLET | Refills: 0 | OUTPATIENT
Start: 2018-11-07 | End: 2018-12-06

## 2018-11-07 RX ORDER — DEXTROAMPHETAMINE SACCHARATE, AMPHETAMINE ASPARTATE, DEXTROAMPHETAMINE SULFATE AND AMPHETAMINE SULFATE 5; 5; 5; 5 MG/1; MG/1; MG/1; MG/1
TABLET ORAL
Qty: 120 TABLET | Refills: 0 | Status: SHIPPED | OUTPATIENT
Start: 2018-11-07 | End: 2018-12-06

## 2018-11-07 NOTE — TELEPHONE ENCOUNTER
Patient is requesting Rx for 3 months. November 7th has already been printed.    Would need  December 7th and January 7th

## 2018-11-08 ENCOUNTER — TELEPHONE (OUTPATIENT)
Dept: INTERNAL MEDICINE CLINIC | Facility: CLINIC | Age: 38
End: 2018-11-08

## 2018-11-08 RX ORDER — DEXTROAMPHETAMINE SACCHARATE, AMPHETAMINE ASPARTATE, DEXTROAMPHETAMINE SULFATE AND AMPHETAMINE SULFATE 5; 5; 5; 5 MG/1; MG/1; MG/1; MG/1
TABLET ORAL
Qty: 120 TABLET | Refills: 0 | OUTPATIENT
Start: 2018-11-08 | End: 2018-12-06

## 2018-11-08 NOTE — TELEPHONE ENCOUNTER
Called to let pt know that her Adderall rx was ready for . Pt wanted a 90 day supply since that is how [facility] usually does it. Pt was informed that [facility] Cisner[facility] was out of the office and that Dr. Jon Richardson approved for a 30 day supply only.     LOV was in Jul

## 2018-12-03 ENCOUNTER — OFFICE VISIT (OUTPATIENT)
Dept: INTERNAL MEDICINE CLINIC | Facility: CLINIC | Age: 38
End: 2018-12-03
Payer: COMMERCIAL

## 2018-12-03 VITALS
WEIGHT: 168.25 LBS | HEART RATE: 76 BPM | SYSTOLIC BLOOD PRESSURE: 98 MMHG | HEIGHT: 62.99 IN | OXYGEN SATURATION: 99 % | TEMPERATURE: 99 F | RESPIRATION RATE: 16 BRPM | DIASTOLIC BLOOD PRESSURE: 78 MMHG | BODY MASS INDEX: 29.81 KG/M2

## 2018-12-03 DIAGNOSIS — E78.5 HYPERLIPIDEMIA, UNSPECIFIED HYPERLIPIDEMIA TYPE: ICD-10-CM

## 2018-12-03 DIAGNOSIS — E03.9 HYPOTHYROIDISM, UNSPECIFIED TYPE: ICD-10-CM

## 2018-12-03 DIAGNOSIS — F98.8 ATTENTION DEFICIT DISORDER (ADD) WITHOUT HYPERACTIVITY: ICD-10-CM

## 2018-12-03 DIAGNOSIS — G43.909 MIGRAINE WITHOUT STATUS MIGRAINOSUS, NOT INTRACTABLE, UNSPECIFIED MIGRAINE TYPE: ICD-10-CM

## 2018-12-03 PROCEDURE — 99214 OFFICE O/P EST MOD 30 MIN: CPT | Performed by: FAMILY MEDICINE

## 2018-12-03 RX ORDER — DEXTROAMPHETAMINE SACCHARATE, AMPHETAMINE ASPARTATE, DEXTROAMPHETAMINE SULFATE AND AMPHETAMINE SULFATE 5; 5; 5; 5 MG/1; MG/1; MG/1; MG/1
20 TABLET ORAL 2 TIMES DAILY
Qty: 120 TABLET | Refills: 0 | Status: SHIPPED | OUTPATIENT
Start: 2019-01-02 | End: 2019-02-01

## 2018-12-03 RX ORDER — DEXTROAMPHETAMINE SACCHARATE, AMPHETAMINE ASPARTATE, DEXTROAMPHETAMINE SULFATE AND AMPHETAMINE SULFATE 5; 5; 5; 5 MG/1; MG/1; MG/1; MG/1
20 TABLET ORAL 2 TIMES DAILY
Qty: 120 TABLET | Refills: 0 | Status: SHIPPED | OUTPATIENT
Start: 2018-12-03 | End: 2019-01-02

## 2018-12-03 RX ORDER — DEXTROAMPHETAMINE SACCHARATE, AMPHETAMINE ASPARTATE, DEXTROAMPHETAMINE SULFATE AND AMPHETAMINE SULFATE 5; 5; 5; 5 MG/1; MG/1; MG/1; MG/1
20 TABLET ORAL 2 TIMES DAILY
Qty: 60 TABLET | Refills: 0 | Status: SHIPPED | OUTPATIENT
Start: 2019-01-02 | End: 2018-12-10

## 2018-12-03 RX ORDER — DEXTROAMPHETAMINE SACCHARATE, AMPHETAMINE ASPARTATE, DEXTROAMPHETAMINE SULFATE AND AMPHETAMINE SULFATE 5; 5; 5; 5 MG/1; MG/1; MG/1; MG/1
20 TABLET ORAL 2 TIMES DAILY
Qty: 60 TABLET | Refills: 0 | Status: SHIPPED | OUTPATIENT
Start: 2019-02-01 | End: 2018-12-10

## 2018-12-03 RX ORDER — ROSUVASTATIN CALCIUM 5 MG/1
5 TABLET, COATED ORAL DAILY
Qty: 30 TABLET | Refills: 3 | Status: SHIPPED | OUTPATIENT
Start: 2018-12-03 | End: 2019-12-16

## 2018-12-03 RX ORDER — DEXTROAMPHETAMINE SACCHARATE, AMPHETAMINE ASPARTATE, DEXTROAMPHETAMINE SULFATE AND AMPHETAMINE SULFATE 5; 5; 5; 5 MG/1; MG/1; MG/1; MG/1
TABLET ORAL
Qty: 120 TABLET | Refills: 0 | Status: CANCELLED | OUTPATIENT
Start: 2018-12-03 | End: 2019-01-01

## 2018-12-03 RX ORDER — DEXTROAMPHETAMINE SACCHARATE, AMPHETAMINE ASPARTATE, DEXTROAMPHETAMINE SULFATE AND AMPHETAMINE SULFATE 5; 5; 5; 5 MG/1; MG/1; MG/1; MG/1
20 TABLET ORAL 2 TIMES DAILY
Qty: 120 TABLET | Refills: 0 | Status: SHIPPED | OUTPATIENT
Start: 2019-02-01 | End: 2019-03-03

## 2018-12-03 RX ORDER — RIZATRIPTAN BENZOATE 10 MG/1
TABLET ORAL
Qty: 9 TABLET | Refills: 3 | Status: SHIPPED | OUTPATIENT
Start: 2018-12-03 | End: 2020-09-29

## 2018-12-03 RX ORDER — LEVOTHYROXINE SODIUM 0.12 MG/1
TABLET ORAL
Qty: 30 TABLET | Refills: 3 | Status: SHIPPED | OUTPATIENT
Start: 2018-12-03 | End: 2019-05-26

## 2018-12-03 NOTE — PROGRESS NOTES
Emelyn Ragsdale is a 43year old female. HPI:   Patient presents for recheck of her ADHD. Patient has been using the stimulant medication, Adderall, on a regular basis. Patient was last seen in July 2018. Medication changes at that time:  none.   Pa (MAXALT) 10 MG Oral Tab Take one at the onset of your headache and repeat in 2 hours if needed. Disp: 9 tablet Rfl: 3   amphetamine-dextroamphetamine (ADDERALL) 20 MG Oral Tab Take 1 tablet (20 mg total) by mouth 2 (two) times daily.  Disp: 120 tablet Rfl: oral contraceptives    • Normal delivery     x3   • Pap smear for cervical cancer screening 5/16/16, 9/18/12, 5/18/09, 11/5/07, 2/23/07, 12/21/05, 3/10/04    WNL   • Thyroid disease          Social History:    Social History    Tobacco Use      Smoking sta intractable, unspecified migraine type  Attention deficit disorder (add) without hyperactivity     1.  Hypothyroidism, unspecified type  - stable, continue medicaiton  - Levothyroxine Sodium 125 MCG Oral Tab; TAKE 1 TABLET BY MOUTH BEFORE BREAKFAST  Dispens amphetamine-dextroamphetamine (ADDERALL) 20 MG Oral Tab 60 tablet 0     Sig: Take 1 tablet (20 mg total) by mouth 2 (two) times daily.    • amphetamine-dextroamphetamine (ADDERALL) 20 MG Oral Tab 60 tablet 0     Sig: Take 1 tablet (20 mg total) by mouth 2 (

## 2018-12-04 DIAGNOSIS — E03.9 HYPOTHYROIDISM, UNSPECIFIED TYPE: ICD-10-CM

## 2018-12-05 RX ORDER — LEVOTHYROXINE SODIUM 0.12 MG/1
TABLET ORAL
Qty: 30 TABLET | Refills: 0 | OUTPATIENT
Start: 2018-12-05

## 2018-12-07 DIAGNOSIS — E78.5 HYPERLIPIDEMIA, UNSPECIFIED HYPERLIPIDEMIA TYPE: ICD-10-CM

## 2018-12-07 NOTE — TELEPHONE ENCOUNTER
Talked to [facility], told her how the Rx got declined due to not having labs done within the year. She had stated that she just saw [place] and had labs done in October. She stated she had faxed them over however when she verified the fax number, she had it wrong. She is going to fax it over right now so that we can get medication approved.

## 2018-12-10 ENCOUNTER — TELEPHONE (OUTPATIENT)
Dept: INTERNAL MEDICINE CLINIC | Facility: CLINIC | Age: 38
End: 2018-12-10

## 2018-12-10 RX ORDER — ROSUVASTATIN CALCIUM 5 MG/1
TABLET, COATED ORAL
Qty: 30 TABLET | Refills: 3 | Status: SHIPPED | OUTPATIENT
Start: 2018-12-10 | End: 2019-12-16

## 2018-12-10 NOTE — TELEPHONE ENCOUNTER
Spoke with pharmacist Jamey Valdez to give verbal correction on sig. Patient is to take 2 tablets by mouth twice daily per Carmita Soda.    All future prescriptions have been written in error - pharmacist stated that she will fill the current one but will inform th

## 2018-12-11 RX ORDER — DEXTROAMPHETAMINE SACCHARATE, AMPHETAMINE ASPARTATE, DEXTROAMPHETAMINE SULFATE AND AMPHETAMINE SULFATE 5; 5; 5; 5 MG/1; MG/1; MG/1; MG/1
TABLET ORAL
Qty: 120 TABLET | Refills: 0 | Status: SHIPPED | OUTPATIENT
Start: 2019-02-09 | End: 2019-03-10

## 2018-12-11 RX ORDER — DEXTROAMPHETAMINE SACCHARATE, AMPHETAMINE ASPARTATE, DEXTROAMPHETAMINE SULFATE AND AMPHETAMINE SULFATE 5; 5; 5; 5 MG/1; MG/1; MG/1; MG/1
TABLET ORAL
Qty: 120 TABLET | Refills: 0 | Status: SHIPPED | OUTPATIENT
Start: 2018-12-11 | End: 2019-01-10

## 2018-12-11 RX ORDER — DEXTROAMPHETAMINE SACCHARATE, AMPHETAMINE ASPARTATE, DEXTROAMPHETAMINE SULFATE AND AMPHETAMINE SULFATE 5; 5; 5; 5 MG/1; MG/1; MG/1; MG/1
TABLET ORAL
Qty: 120 TABLET | Refills: 0 | Status: SHIPPED | OUTPATIENT
Start: 2018-12-11 | End: 2020-10-05

## 2018-12-11 RX ORDER — DEXTROAMPHETAMINE SACCHARATE, AMPHETAMINE ASPARTATE, DEXTROAMPHETAMINE SULFATE AND AMPHETAMINE SULFATE 5; 5; 5; 5 MG/1; MG/1; MG/1; MG/1
TABLET ORAL
Qty: 120 TABLET | Refills: 0 | Status: SHIPPED | OUTPATIENT
Start: 2019-01-10 | End: 2019-02-09

## 2018-12-11 RX ORDER — DEXTROAMPHETAMINE SACCHARATE, AMPHETAMINE ASPARTATE, DEXTROAMPHETAMINE SULFATE AND AMPHETAMINE SULFATE 5; 5; 5; 5 MG/1; MG/1; MG/1; MG/1
TABLET ORAL
Qty: 120 TABLET | Refills: 0 | Status: SHIPPED | OUTPATIENT
Start: 2018-12-11 | End: 2020-03-04

## 2018-12-11 NOTE — TELEPHONE ENCOUNTER
Spoke with patient and informed her that her Rx's have been corrected and will be placed at the  for her. She is required to bring in incorrect scripts for shredding - please collect before giving new Rx's - patient expressed understanding.    Hus

## 2019-01-09 RX ORDER — ALPRAZOLAM 0.25 MG/1
TABLET ORAL
Qty: 20 TABLET | Refills: 0 | Status: SHIPPED | OUTPATIENT
Start: 2019-01-09 | End: 2019-05-28

## 2019-01-09 NOTE — TELEPHONE ENCOUNTER
Alprazolam  Last OV relevant to medication: 12/3/18  Last refill date: 6/7/18  #/refills: 0  When pt was asked to return for OV: 6 months  Upcoming appt/reason: none  Recent Labs: none    Pt called she is flying out of town.

## 2019-03-06 PROBLEM — K21.9 GASTROESOPHAGEAL REFLUX DISEASE: Status: ACTIVE | Noted: 2017-11-01

## 2019-03-06 PROBLEM — M54.50 LOW BACK PAIN: Status: ACTIVE | Noted: 2017-11-01

## 2019-03-07 NOTE — PROGRESS NOTES
Nir Valenzuela is a 43year old female. HPI:   Patient presents for recheck of her ADHD. Patient has been using the stimulant medication, Adderall on a regular basis. Patient was last seen 12/2018. Medication changes at that time:  none.   Patient amphetamine-dextroamphetamine (ADDERALL) 20 MG Oral Tab TAKE 2 (TWO) TABLETS BY MOUTH TWICE DAILY.  Disp: 120 tablet Rfl: 0   amphetamine-dextroamphetamine (ADDERALL) 20 MG Oral Tab Take 2 tablets twice a day Disp: 120 tablet Rfl: 0   ROSUVASTATIN CALCIUM tinnitus.   CHEST: Denies chest pain, or palpitations  LUNGS: Denies shortness of breath, cough, or wheezing  GI: Denies abdominal pain, N/V/C/D.   MUSCULOSKELETAL: Denies any arthralgia,myalgias or swollen joints  LYMPH:  Denies lymphadenopathy  NEURO:  Gracie Gentleman asked to return in 6. Hyperlipidemia- Pt to continue their medication, increase exercise,  choose better fats,  increase fiber and avoid processed foods. Hypothyroidism-   Patient at goal, no symptoms of over or under-replaced.    TSH chercked yesterd

## 2019-03-10 ENCOUNTER — PATIENT MESSAGE (OUTPATIENT)
Dept: INTERNAL MEDICINE CLINIC | Facility: CLINIC | Age: 39
End: 2019-03-10

## 2019-03-12 ENCOUNTER — TELEPHONE (OUTPATIENT)
Dept: INTERNAL MEDICINE CLINIC | Facility: CLINIC | Age: 39
End: 2019-03-12

## 2019-03-12 LAB
ALBUMIN: 4 G/DL
ALKALINE PHOSPHATASE: 74
ALT: 13
AST: 15
BASOPHIL %: 0.6
BILIRUBIN, TOTAL: 0.4 MG/DL
BUN: 16
CALCIUM: 9.4
CHLORIDE: 103
CHOL/HDL RATIO: 4
CHOLESTEROL, TOTAL: 199 MG/DL
CO2: 26
CREATININE: 0.7 MG/DL
EGFR: >60
EOSINOPHIL %: 2.8
FERRITIN: 136 NG/ML
FOLATE (FOLIC ACID), SERUM: >24
GLUCOSE BLOOD: 88
HDL CHOLESTEROL: 46 MG/DL
HEMATOCRIT: 43.3 %
HEMOGLOBIN: 14.4 G/DL (ref 12–15)
IRON BINDING CAPACITY: 315 MCG/DL
IRON, TOTAL: 122 MCG/DL
LDL CHOLESTEROL: 137 MG/DL (ref ?–130)
LYMPHOCYTE %: 45.2
MCH: 30 PG
MCHC: 33.4 G/DL
MCV: 89.9 FL
MEAN PLATELET VOLUME: 8
MONOCYTE %: 6
NEUTROPHIL %: 45.1
NUCLEATED RBC: <2
PLATELETS: 310 X10E3/UL
POTASSIUM: 4.6
RBC: 4.81 /HPF
RDW: 13.7 %
SODIUM: 137
THYROXINE (T4): 8.5 ΜG/DL
TOTAL PROTEIN: 6.4
TRIGLYCERIDES: 78 MG/DL
TSH W/REFLEX TO FT4: 1.3
VITAMIN B12: 1015 PG/ML
VITAMIN D, 25-HYDROXY: 60.7 NG/ML
WBC: 6 /HPF

## 2019-03-14 NOTE — TELEPHONE ENCOUNTER
Called pharmacy and talked to the pharmacist to get clarification about Rx being denied. Pharmacist states it was picked and savings card was used. She couldn't tell me if a PA was needed.

## 2019-04-09 ENCOUNTER — PATIENT MESSAGE (OUTPATIENT)
Dept: INTERNAL MEDICINE CLINIC | Facility: CLINIC | Age: 39
End: 2019-04-09

## 2019-04-10 NOTE — TELEPHONE ENCOUNTER
Called [facility], Initiated PA. Turn around time 72 hours. Talked to [facility].    Waiting on response for approval or denial.

## 2019-04-10 NOTE — TELEPHONE ENCOUNTER
From: Lorrene Pill  To: THAIS Pittman  Sent: 4/9/2019 8:32 PM CDT  Subject: Prescription Question    I went to  my Adderall and it wasn't covered again this month.  Consuelo said they sent a notification last month that my medication need

## 2019-04-11 ENCOUNTER — TELEPHONE (OUTPATIENT)
Dept: INTERNAL MEDICINE CLINIC | Facility: CLINIC | Age: 39
End: 2019-04-11

## 2019-04-11 NOTE — TELEPHONE ENCOUNTER
Called pharmacy to notify that PA got approved. Pharmacy tech. Stated that she paid out of pocket for it with savings card.      KEZIA siddiqi approved for: 120 per month  Granted from 4-9-19 through 4-9-20

## 2019-05-26 DIAGNOSIS — E03.9 HYPOTHYROIDISM, UNSPECIFIED TYPE: ICD-10-CM

## 2019-05-28 RX ORDER — LEVOTHYROXINE SODIUM 0.12 MG/1
TABLET ORAL
Qty: 30 TABLET | Refills: 3 | Status: SHIPPED | OUTPATIENT
Start: 2019-05-28 | End: 2019-09-23

## 2019-05-28 RX ORDER — DEXTROAMPHETAMINE SACCHARATE, AMPHETAMINE ASPARTATE, DEXTROAMPHETAMINE SULFATE AND AMPHETAMINE SULFATE 5; 5; 5; 5 MG/1; MG/1; MG/1; MG/1
TABLET ORAL
Qty: 120 TABLET | Refills: 0 | Status: SHIPPED | OUTPATIENT
Start: 2019-06-27 | End: 2019-07-27

## 2019-05-28 RX ORDER — DEXTROAMPHETAMINE SACCHARATE, AMPHETAMINE ASPARTATE, DEXTROAMPHETAMINE SULFATE AND AMPHETAMINE SULFATE 5; 5; 5; 5 MG/1; MG/1; MG/1; MG/1
TABLET ORAL
Qty: 120 TABLET | Refills: 0 | Status: SHIPPED | OUTPATIENT
Start: 2019-07-27 | End: 2019-08-26

## 2019-05-28 RX ORDER — DEXTROAMPHETAMINE SACCHARATE, AMPHETAMINE ASPARTATE, DEXTROAMPHETAMINE SULFATE AND AMPHETAMINE SULFATE 5; 5; 5; 5 MG/1; MG/1; MG/1; MG/1
TABLET ORAL
Qty: 120 TABLET | Refills: 0 | Status: CANCELLED | OUTPATIENT
Start: 2019-05-28 | End: 2019-06-28

## 2019-05-28 RX ORDER — ALPRAZOLAM 0.25 MG/1
TABLET ORAL
Qty: 30 TABLET | Refills: 0 | Status: SHIPPED | OUTPATIENT
Start: 2019-05-28 | End: 2020-04-07

## 2019-05-28 RX ORDER — DEXTROAMPHETAMINE SACCHARATE, AMPHETAMINE ASPARTATE, DEXTROAMPHETAMINE SULFATE AND AMPHETAMINE SULFATE 5; 5; 5; 5 MG/1; MG/1; MG/1; MG/1
TABLET ORAL
Qty: 120 TABLET | Refills: 0 | Status: SHIPPED | OUTPATIENT
Start: 2019-05-28 | End: 2019-06-27

## 2019-05-28 NOTE — TELEPHONE ENCOUNTER
Pt comment: traveling/flying in June, July and August. Was seen in office in March 2019  Alprazolam   Last OV relevant to medication: 3-7-19  Last refill date: 1-9-19  #/refills: 0  When pt was asked to return for OV: 6months  Upcoming appt/reason: none  R

## 2019-05-28 NOTE — TELEPHONE ENCOUNTER
Levothyroxine   Last OV relevant to medication: 3-7-19  Last refill date: 12-3-18  #/refills: 3  When pt was asked to return for OV: 6months  Upcoming appt/reason: none  Recent labs: 3-6-19: Thyroid

## 2019-07-12 ENCOUNTER — TELEPHONE (OUTPATIENT)
Dept: INTERNAL MEDICINE CLINIC | Facility: CLINIC | Age: 39
End: 2019-07-12

## 2019-07-12 LAB
ALBUMIN: 4.5 G/DL
ALKALINE PHOSPHATASE: 64
ALT: 16
AST: 24
BASOPHIL %: 1.1
BILIRUBIN, TOTAL: 0.6 MG/DL
BUN: 18
CALCIUM: 9.7
CHLORIDE: 103
CHOL/HDL RATIO: 3
CHOLESTEROL, TOTAL: 178 MG/DL
CO2: 26
CREATININE: 0.9 MG/DL
EGFR: >60
EOSINOPHIL %: 2.8
GLUCOSE BLOOD: 68
HDL CHOLESTEROL: 54 MG/DL
HEMATOCRIT: 42.7 %
HEMOGLOBIN: 14.1 G/DL (ref 12–15)
LDL CHOLESTEROL: 103 MG/DL (ref ?–130)
LYMPHOCYTE %: 45
MCH: 29.9 PG
MCHC: 33 G/DL
MCV: 90.5 FL
MEAN PLATELET VOLUME: 9
MONOCYTE %: 8
NEUTROPHIL %: 43.5
PLATELETS: 314 K/UL
POTASSIUM: 4.5
RBC: 4.72 M/UL
RDW: 13.8 %
SODIUM: 142
THYROID STIMULATING IMMUNOGLOB: 0.89
THYROXINE (T4): 9 ΜG/DL
TOTAL PROTEIN: 6.6
TRIGLYCERIDES: 106 MG/DL
TSH W/REFLEX TO FT4: 1.3
VITAMIN D, 25-HYDROXY: 58.1 NG/ML
WBC: 6.1 K/UL

## 2019-08-07 ENCOUNTER — PATIENT MESSAGE (OUTPATIENT)
Dept: INTERNAL MEDICINE CLINIC | Facility: CLINIC | Age: 39
End: 2019-08-07

## 2019-08-07 DIAGNOSIS — E78.5 HYPERLIPIDEMIA, UNSPECIFIED HYPERLIPIDEMIA TYPE: ICD-10-CM

## 2019-08-07 RX ORDER — DEXTROAMPHETAMINE SACCHARATE, AMPHETAMINE ASPARTATE, DEXTROAMPHETAMINE SULFATE AND AMPHETAMINE SULFATE 5; 5; 5; 5 MG/1; MG/1; MG/1; MG/1
TABLET ORAL
Qty: 120 TABLET | Refills: 0 | Status: SHIPPED | OUTPATIENT
Start: 2019-10-07 | End: 2019-11-07

## 2019-08-07 RX ORDER — DEXTROAMPHETAMINE SACCHARATE, AMPHETAMINE ASPARTATE, DEXTROAMPHETAMINE SULFATE AND AMPHETAMINE SULFATE 5; 5; 5; 5 MG/1; MG/1; MG/1; MG/1
TABLET ORAL
Qty: 120 TABLET | Refills: 0 | Status: SHIPPED | OUTPATIENT
Start: 2019-08-07 | End: 2019-09-07

## 2019-08-07 RX ORDER — DEXTROAMPHETAMINE SACCHARATE, AMPHETAMINE ASPARTATE, DEXTROAMPHETAMINE SULFATE AND AMPHETAMINE SULFATE 5; 5; 5; 5 MG/1; MG/1; MG/1; MG/1
TABLET ORAL
Qty: 120 TABLET | Refills: 0 | Status: SHIPPED | OUTPATIENT
Start: 2019-09-07 | End: 2019-10-07

## 2019-08-07 RX ORDER — ROSUVASTATIN CALCIUM 5 MG/1
TABLET, COATED ORAL
Qty: 30 TABLET | Refills: 2 | Status: SHIPPED | OUTPATIENT
Start: 2019-08-07 | End: 2019-11-24

## 2019-08-07 NOTE — TELEPHONE ENCOUNTER
From: Kennedi Arechiga  To: THAIS Patten  Sent: 8/7/2019 8:48 AM CDT  Subject: Prescription Question    I just sent a request for the generic adderall.  There wasn't a spot to indicate 3 months RX like she normally gives me so I'm sending it in a me

## 2019-08-07 NOTE — TELEPHONE ENCOUNTER
Rosuvastatin 5 mg 1 tab daily filled 12-10-18 30 with 3 refills     LOV 3-7-19   No upcoming apt on file   Labs 7-10-19   Cholesterol, Total mg/dL 178    HDL Cholesterol mg/dL 54    CHOL/HDL RATIO  3    Triglycerides mg/dL 106    LDL Cholesterol <130 mg/dL

## 2019-08-09 RX ORDER — DEXTROAMPHETAMINE SACCHARATE, AMPHETAMINE ASPARTATE, DEXTROAMPHETAMINE SULFATE AND AMPHETAMINE SULFATE 5; 5; 5; 5 MG/1; MG/1; MG/1; MG/1
TABLET ORAL
Qty: 120 TABLET | Refills: 0 | OUTPATIENT
Start: 2019-08-09 | End: 2019-09-06

## 2019-09-23 DIAGNOSIS — E03.9 HYPOTHYROIDISM, UNSPECIFIED TYPE: ICD-10-CM

## 2019-09-23 RX ORDER — LEVOTHYROXINE SODIUM 0.12 MG/1
TABLET ORAL
Qty: 30 TABLET | Refills: 0 | Status: SHIPPED | OUTPATIENT
Start: 2019-09-23 | End: 2019-10-30

## 2019-09-23 NOTE — TELEPHONE ENCOUNTER
Levothyroxine 125 MCG  Last OV relevant to medication: 3-7-19  Last refill date: 5-28-19  #/refills: 3  When pt was asked to return for OV: 6 mo.   Upcoming appt/reason: none  Recent labs: 7-10-19: thyroid

## 2019-10-30 DIAGNOSIS — E03.9 HYPOTHYROIDISM, UNSPECIFIED TYPE: ICD-10-CM

## 2019-10-31 RX ORDER — LEVOTHYROXINE SODIUM 0.12 MG/1
TABLET ORAL
Qty: 30 TABLET | Refills: 2 | Status: SHIPPED | OUTPATIENT
Start: 2019-10-31 | End: 2020-01-01

## 2019-10-31 RX ORDER — DEXTROAMPHETAMINE SACCHARATE, AMPHETAMINE ASPARTATE, DEXTROAMPHETAMINE SULFATE AND AMPHETAMINE SULFATE 5; 5; 5; 5 MG/1; MG/1; MG/1; MG/1
TABLET ORAL
Qty: 120 TABLET | Refills: 0 | Status: CANCELLED | OUTPATIENT
Start: 2019-10-31 | End: 2019-12-01

## 2019-11-04 RX ORDER — DEXTROAMPHETAMINE SACCHARATE, AMPHETAMINE ASPARTATE, DEXTROAMPHETAMINE SULFATE AND AMPHETAMINE SULFATE 5; 5; 5; 5 MG/1; MG/1; MG/1; MG/1
TABLET ORAL
Qty: 120 TABLET | Refills: 0 | Status: SHIPPED | OUTPATIENT
Start: 2019-12-05 | End: 2020-01-04

## 2019-11-04 RX ORDER — DEXTROAMPHETAMINE SACCHARATE, AMPHETAMINE ASPARTATE, DEXTROAMPHETAMINE SULFATE AND AMPHETAMINE SULFATE 5; 5; 5; 5 MG/1; MG/1; MG/1; MG/1
TABLET ORAL
Qty: 120 TABLET | Refills: 0 | Status: SHIPPED | OUTPATIENT
Start: 2019-11-04 | End: 2019-12-04

## 2019-11-04 RX ORDER — DEXTROAMPHETAMINE SACCHARATE, AMPHETAMINE ASPARTATE, DEXTROAMPHETAMINE SULFATE AND AMPHETAMINE SULFATE 5; 5; 5; 5 MG/1; MG/1; MG/1; MG/1
TABLET ORAL
Qty: 120 TABLET | Refills: 0 | Status: SHIPPED | OUTPATIENT
Start: 2020-01-05 | End: 2020-02-04

## 2019-11-24 DIAGNOSIS — E78.5 HYPERLIPIDEMIA, UNSPECIFIED HYPERLIPIDEMIA TYPE: ICD-10-CM

## 2019-11-25 RX ORDER — ROSUVASTATIN CALCIUM 5 MG/1
TABLET, COATED ORAL
Qty: 30 TABLET | Refills: 2 | Status: SHIPPED | OUTPATIENT
Start: 2019-11-25 | End: 2020-04-07

## 2019-11-25 NOTE — TELEPHONE ENCOUNTER
Rosuvastatin 5 MG  Last OV relevant to medication: 3-7-19  Last refill date: 8-7-19 #/refills: 2  When pt was asked to return for OV: 6 mo.   Upcoming appt/reason: none  Recent labs: 7-10-19: lipid

## 2019-12-16 DIAGNOSIS — E78.5 HYPERLIPIDEMIA, UNSPECIFIED HYPERLIPIDEMIA TYPE: ICD-10-CM

## 2019-12-16 RX ORDER — ROSUVASTATIN CALCIUM 5 MG/1
TABLET, COATED ORAL
Qty: 30 TABLET | Refills: 2 | Status: SHIPPED | OUTPATIENT
Start: 2019-12-16 | End: 2020-02-10

## 2019-12-16 NOTE — TELEPHONE ENCOUNTER
Rosuvastatin 5 mg  Last OV relevant to medication: 3-7-19  Last refill date: 11-25-19 #/refills: 2  When pt was asked to return for OV: 6 mo.   Upcoming appt/reason: none  Recent labs: 7-10-19: Lipid

## 2020-01-01 DIAGNOSIS — E03.9 HYPOTHYROIDISM, UNSPECIFIED TYPE: ICD-10-CM

## 2020-01-02 RX ORDER — LEVOTHYROXINE SODIUM 0.12 MG/1
TABLET ORAL
Qty: 30 TABLET | Refills: 2 | Status: SHIPPED | OUTPATIENT
Start: 2020-01-02 | End: 2020-03-12

## 2020-01-02 NOTE — TELEPHONE ENCOUNTER
Sent my chart message stating pt is due for cpx. Levothyroxine 125 MCG  Last OV relevant to medication: 3-7-19  Last refill date: 10-31-19 #/refills: 2  When pt was asked to return for OV: 6 mo.   Upcoming appt/reason: none  Recent labs: 7-10-19: thyroi

## 2020-02-09 ENCOUNTER — PATIENT MESSAGE (OUTPATIENT)
Dept: INTERNAL MEDICINE CLINIC | Facility: CLINIC | Age: 40
End: 2020-02-09

## 2020-02-10 NOTE — TELEPHONE ENCOUNTER
From: Kennedi Arechiga  To: THASI Rowley  Sent: 2/9/2020 5:35 PM CST  Subject: Prescription Question    Hi,    I just sent a request for the generic adderall.  There wasn't a spot to indicate 3 months RX like she normally gives me so I'm sending it i

## 2020-02-10 NOTE — TELEPHONE ENCOUNTER
**[facility] to [facility]. Patient needs to schedule an appointment to be seen for medication. If patient calls please schedule OV with Jenny Nayak. **      amphetamine-dextroamphetamine (ADDERALL) 20 MG Oral Tab    Last OV relevant to medication:

## 2020-02-10 NOTE — PROGRESS NOTES
Thuy Angela is a 35year old female. HPI:   Patient presents for recheck of her ADHD. Patient has been using the stimulant medication, adderall, on a regular basis. Patient was last seen 11 months ago. Medication changes at that time: none.   Pa • ROSUVASTATIN CALCIUM 5 MG Oral Tab TAKE 1 TABLET(5 MG) BY MOUTH DAILY 30 tablet 2   • ALPRAZolam (XANAX) 0.25 MG Oral Tab 1 tablet PO BID prn  .  30 tablet 0   • amphetamine-dextroamphetamine (ADDERALL) 20 MG Oral Tab TAKE 2 (TWO) TABLETS BY MOUTH 2(TWO confusion, no weakness, no abnormal sensation, no vertigo.     EXAM:   /70   Pulse 104   Temp 98 °F (36.7 °C) (Oral)   Resp 16   Ht 63\"   Wt 172 lb (78 kg)   SpO2 99%   BMI 30.47 kg/m²   GENERAL: well developed, well nourished,in no apparent distress

## 2020-02-11 RX ORDER — DEXTROAMPHETAMINE SACCHARATE, AMPHETAMINE ASPARTATE, DEXTROAMPHETAMINE SULFATE AND AMPHETAMINE SULFATE 5; 5; 5; 5 MG/1; MG/1; MG/1; MG/1
TABLET ORAL
Qty: 120 TABLET | Refills: 0 | OUTPATIENT
Start: 2020-02-11

## 2020-02-11 RX ORDER — DEXTROAMPHETAMINE SACCHARATE, AMPHETAMINE ASPARTATE, DEXTROAMPHETAMINE SULFATE AND AMPHETAMINE SULFATE 5; 5; 5; 5 MG/1; MG/1; MG/1; MG/1
20 TABLET ORAL DAILY
Qty: 30 TABLET | Refills: 0 | OUTPATIENT
Start: 2020-03-12 | End: 2020-04-11

## 2020-02-11 RX ORDER — DEXTROAMPHETAMINE SACCHARATE, AMPHETAMINE ASPARTATE, DEXTROAMPHETAMINE SULFATE AND AMPHETAMINE SULFATE 5; 5; 5; 5 MG/1; MG/1; MG/1; MG/1
20 TABLET ORAL DAILY
Qty: 30 TABLET | Refills: 0 | OUTPATIENT
Start: 2020-02-11 | End: 2020-03-12

## 2020-03-04 ENCOUNTER — OFFICE VISIT (OUTPATIENT)
Dept: OBGYN CLINIC | Facility: CLINIC | Age: 40
End: 2020-03-04
Payer: COMMERCIAL

## 2020-03-04 VITALS
WEIGHT: 175.81 LBS | HEIGHT: 63 IN | DIASTOLIC BLOOD PRESSURE: 70 MMHG | SYSTOLIC BLOOD PRESSURE: 116 MMHG | BODY MASS INDEX: 31.15 KG/M2 | HEART RATE: 77 BPM

## 2020-03-04 DIAGNOSIS — Z01.419 WELL WOMAN EXAM WITH ROUTINE GYNECOLOGICAL EXAM: Primary | ICD-10-CM

## 2020-03-04 DIAGNOSIS — Z12.31 ENCOUNTER FOR SCREENING MAMMOGRAM FOR MALIGNANT NEOPLASM OF BREAST: ICD-10-CM

## 2020-03-04 DIAGNOSIS — Z12.4 CERVICAL CANCER SCREENING: ICD-10-CM

## 2020-03-04 PROCEDURE — 87624 HPV HI-RISK TYP POOLED RSLT: CPT | Performed by: NURSE PRACTITIONER

## 2020-03-04 PROCEDURE — 99396 PREV VISIT EST AGE 40-64: CPT | Performed by: NURSE PRACTITIONER

## 2020-03-04 NOTE — PROGRESS NOTES
Here for Routine Annual Exam  No concerns or questions. Menses are semi- regular, no concerns. Contraception- Mirena IUD. No C/O, had breast augmentation and abdominoplasty lat year. ROS: No Cardiac, Respiratory, GI,  or Neurological symptoms.

## 2020-03-09 LAB — HPV I/H RISK 1 DNA SPEC QL NAA+PROBE: NEGATIVE

## 2020-03-12 DIAGNOSIS — E03.9 HYPOTHYROIDISM, UNSPECIFIED TYPE: ICD-10-CM

## 2020-03-12 DIAGNOSIS — F98.8 ATTENTION DEFICIT DISORDER (ADD) WITHOUT HYPERACTIVITY: ICD-10-CM

## 2020-03-12 DIAGNOSIS — K21.9 GASTROESOPHAGEAL REFLUX DISEASE WITHOUT ESOPHAGITIS: ICD-10-CM

## 2020-03-12 RX ORDER — PANTOPRAZOLE SODIUM 40 MG/1
TABLET, DELAYED RELEASE ORAL
Qty: 60 TABLET | Refills: 2 | Status: SHIPPED | OUTPATIENT
Start: 2020-03-12 | End: 2020-04-07

## 2020-03-12 RX ORDER — DEXTROAMPHETAMINE SACCHARATE, AMPHETAMINE ASPARTATE, DEXTROAMPHETAMINE SULFATE AND AMPHETAMINE SULFATE 5; 5; 5; 5 MG/1; MG/1; MG/1; MG/1
TABLET ORAL
Qty: 120 TABLET | Refills: 0 | Status: SHIPPED | OUTPATIENT
Start: 2020-03-12 | End: 2020-04-11

## 2020-03-12 RX ORDER — LEVOTHYROXINE SODIUM 0.12 MG/1
TABLET ORAL
Qty: 30 TABLET | Refills: 2 | Status: SHIPPED | OUTPATIENT
Start: 2020-03-12 | End: 2020-08-31

## 2020-03-12 NOTE — TELEPHONE ENCOUNTER
Pantoprazole 40 mg  Last OV relevant to medication: 2-  Last refill date: 2-10-20 #/refills: 0  When pt was asked to return for OV: 6 mo.   Upcoming appt/reason: none  Recent labs: 7-10-19: CMP

## 2020-03-12 NOTE — TELEPHONE ENCOUNTER
Passed protocol    Requesting Levothyroxine Sodium 125 MCG Oral Tab  LOV: 02/10/2020  RTC: 6 months  Last Relevant Labs: 7/10/19  Filled: 01/02/2020 #30 with 2 refills    No future appointments.

## 2020-03-12 NOTE — TELEPHONE ENCOUNTER
adderall 20 mg  Last OV relevant to medication: 2-  Last refill date: 4- #/refills: 0  When pt was asked to return for OV: 6 mo.   Upcoming appt/reason: none  Recent labs: none

## 2020-04-07 DIAGNOSIS — E78.5 HYPERLIPIDEMIA, UNSPECIFIED HYPERLIPIDEMIA TYPE: ICD-10-CM

## 2020-04-07 DIAGNOSIS — F98.8 ATTENTION DEFICIT DISORDER (ADD) WITHOUT HYPERACTIVITY: ICD-10-CM

## 2020-04-07 DIAGNOSIS — K21.9 GASTROESOPHAGEAL REFLUX DISEASE WITHOUT ESOPHAGITIS: ICD-10-CM

## 2020-04-07 RX ORDER — DEXTROAMPHETAMINE SACCHARATE, AMPHETAMINE ASPARTATE, DEXTROAMPHETAMINE SULFATE AND AMPHETAMINE SULFATE 5; 5; 5; 5 MG/1; MG/1; MG/1; MG/1
TABLET ORAL
Qty: 120 TABLET | Refills: 0 | Status: SHIPPED | OUTPATIENT
Start: 2020-04-07 | End: 2020-05-03

## 2020-04-07 RX ORDER — DEXTROAMPHETAMINE SACCHARATE, AMPHETAMINE ASPARTATE, DEXTROAMPHETAMINE SULFATE AND AMPHETAMINE SULFATE 5; 5; 5; 5 MG/1; MG/1; MG/1; MG/1
TABLET ORAL
Qty: 120 TABLET | Refills: 0 | Status: SHIPPED | OUTPATIENT
Start: 2020-05-08 | End: 2020-06-03

## 2020-04-07 RX ORDER — ROSUVASTATIN CALCIUM 5 MG/1
5 TABLET, COATED ORAL DAILY
Qty: 30 TABLET | Refills: 0 | Status: SHIPPED | OUTPATIENT
Start: 2020-04-07 | End: 2020-08-31

## 2020-04-07 RX ORDER — ALPRAZOLAM 0.25 MG/1
TABLET ORAL
Qty: 30 TABLET | Refills: 0 | Status: SHIPPED | OUTPATIENT
Start: 2020-04-07 | End: 2020-08-31

## 2020-04-07 RX ORDER — DEXTROAMPHETAMINE SACCHARATE, AMPHETAMINE ASPARTATE, DEXTROAMPHETAMINE SULFATE AND AMPHETAMINE SULFATE 5; 5; 5; 5 MG/1; MG/1; MG/1; MG/1
TABLET ORAL
Qty: 120 TABLET | Refills: 0 | Status: CANCELLED | OUTPATIENT
Start: 2020-04-07 | End: 2020-05-07

## 2020-04-07 RX ORDER — DEXTROAMPHETAMINE SACCHARATE, AMPHETAMINE ASPARTATE, DEXTROAMPHETAMINE SULFATE AND AMPHETAMINE SULFATE 5; 5; 5; 5 MG/1; MG/1; MG/1; MG/1
TABLET ORAL
Qty: 120 TABLET | Refills: 0 | Status: SHIPPED | OUTPATIENT
Start: 2020-06-08 | End: 2020-07-08

## 2020-04-07 RX ORDER — PANTOPRAZOLE SODIUM 40 MG/1
40 TABLET, DELAYED RELEASE ORAL 2 TIMES DAILY
Qty: 60 TABLET | Refills: 2 | Status: SHIPPED | OUTPATIENT
Start: 2020-04-07 | End: 2020-07-27

## 2020-05-03 RX ORDER — DEXTROAMPHETAMINE SACCHARATE, AMPHETAMINE ASPARTATE, DEXTROAMPHETAMINE SULFATE AND AMPHETAMINE SULFATE 5; 5; 5; 5 MG/1; MG/1; MG/1; MG/1
TABLET ORAL
Qty: 120 TABLET | Refills: 0 | Status: SHIPPED | OUTPATIENT
Start: 2020-05-03 | End: 2020-06-02

## 2020-05-05 ENCOUNTER — PATIENT MESSAGE (OUTPATIENT)
Dept: INTERNAL MEDICINE CLINIC | Facility: CLINIC | Age: 40
End: 2020-05-05

## 2020-05-05 NOTE — TELEPHONE ENCOUNTER
From: Shonna Rueda  To: THAIS Field  Sent: 2020 10:00 AM CDT  Subject: Prescription Question    I believe the Adderall RX needs a new prior auth, as it  May. Can you submit the prior auth? Thank you!

## 2020-06-03 RX ORDER — DEXTROAMPHETAMINE SACCHARATE, AMPHETAMINE ASPARTATE, DEXTROAMPHETAMINE SULFATE AND AMPHETAMINE SULFATE 5; 5; 5; 5 MG/1; MG/1; MG/1; MG/1
TABLET ORAL
Qty: 120 TABLET | Refills: 0 | Status: SHIPPED | OUTPATIENT
Start: 2020-06-03 | End: 2020-07-03

## 2020-07-05 RX ORDER — DEXTROAMPHETAMINE SACCHARATE, AMPHETAMINE ASPARTATE, DEXTROAMPHETAMINE SULFATE AND AMPHETAMINE SULFATE 5; 5; 5; 5 MG/1; MG/1; MG/1; MG/1
TABLET ORAL
Qty: 120 TABLET | Refills: 0 | Status: SHIPPED | OUTPATIENT
Start: 2020-07-05 | End: 2020-07-31

## 2020-07-27 DIAGNOSIS — K21.9 GASTROESOPHAGEAL REFLUX DISEASE WITHOUT ESOPHAGITIS: ICD-10-CM

## 2020-07-27 RX ORDER — PANTOPRAZOLE SODIUM 40 MG/1
TABLET, DELAYED RELEASE ORAL
Qty: 60 TABLET | Refills: 2 | Status: SHIPPED | OUTPATIENT
Start: 2020-07-27 | End: 2020-10-12

## 2020-08-01 RX ORDER — DEXTROAMPHETAMINE SACCHARATE, AMPHETAMINE ASPARTATE, DEXTROAMPHETAMINE SULFATE AND AMPHETAMINE SULFATE 5; 5; 5; 5 MG/1; MG/1; MG/1; MG/1
TABLET ORAL
Qty: 120 TABLET | Refills: 0 | Status: SHIPPED | OUTPATIENT
Start: 2020-08-01 | End: 2020-08-09

## 2020-08-04 ENCOUNTER — TELEPHONE (OUTPATIENT)
Dept: INTERNAL MEDICINE CLINIC | Facility: CLINIC | Age: 40
End: 2020-08-04

## 2020-08-04 NOTE — TELEPHONE ENCOUNTER
Received form stating PA required for Adderall 20 mg. Submitted PA on cover my meds. KEY: [id number]    Your information has been submitted to [employer]. [employer] is reviewing the PA request and you will receive an electronic response.  You may ch

## 2020-08-06 ENCOUNTER — PATIENT MESSAGE (OUTPATIENT)
Dept: INTERNAL MEDICINE CLINIC | Facility: CLINIC | Age: 40
End: 2020-08-06

## 2020-08-07 NOTE — TELEPHONE ENCOUNTER
Sylvester Escalera, KITA [id number] 87:25 PM CDT        ----- Message -----  From: Jim Koch  Sent: 2020 11:17 AM CDT  To: [facility] Clinical Staff  Subject: Prescription Question     The Adderall auth has .  Just wanted to follow up to see the st

## 2020-08-09 RX ORDER — DEXTROAMPHETAMINE SACCHARATE, AMPHETAMINE ASPARTATE, DEXTROAMPHETAMINE SULFATE AND AMPHETAMINE SULFATE 5; 5; 5; 5 MG/1; MG/1; MG/1; MG/1
TABLET ORAL
Qty: 120 TABLET | Refills: 0 | Status: SHIPPED | OUTPATIENT
Start: 2020-08-09 | End: 2020-09-03

## 2020-08-12 NOTE — TELEPHONE ENCOUNTER
Received fax, PA approved. Granted 08/10/20-08/10/21. Qty approved:   Up to 120 per 30 days.     Case #: [id number]  ID #: [id number]

## 2020-08-27 ENCOUNTER — PATIENT MESSAGE (OUTPATIENT)
Dept: INTERNAL MEDICINE CLINIC | Facility: CLINIC | Age: 40
End: 2020-08-27

## 2020-08-27 RX ORDER — DEXTROAMPHETAMINE SACCHARATE, AMPHETAMINE ASPARTATE, DEXTROAMPHETAMINE SULFATE AND AMPHETAMINE SULFATE 5; 5; 5; 5 MG/1; MG/1; MG/1; MG/1
TABLET ORAL
Qty: 120 TABLET | Refills: 0 | OUTPATIENT
Start: 2020-08-27 | End: 2020-09-20

## 2020-08-27 NOTE — TELEPHONE ENCOUNTER
Adderall 20 mt  Last OV relevant to medication: 2-10-20  Last refill date: 8-9-20 #/refills: 0  When pt was asked to return for OV: 6 mo.   Upcoming appt/reason: none  Recent labs: none

## 2020-08-27 NOTE — TELEPHONE ENCOUNTER
Pt was sent a my chart message stating she is due for an appt. Medication was just filled on 8-9-20 per cassi and that's why it was denied.

## 2020-08-30 DIAGNOSIS — E78.5 HYPERLIPIDEMIA, UNSPECIFIED HYPERLIPIDEMIA TYPE: ICD-10-CM

## 2020-08-30 DIAGNOSIS — E03.9 HYPOTHYROIDISM, UNSPECIFIED TYPE: ICD-10-CM

## 2020-08-31 RX ORDER — ALPRAZOLAM 0.25 MG/1
TABLET ORAL
Qty: 30 TABLET | Refills: 0 | Status: SHIPPED | OUTPATIENT
Start: 2020-08-31 | End: 2020-10-12

## 2020-08-31 RX ORDER — LEVOTHYROXINE SODIUM 0.12 MG/1
TABLET ORAL
Qty: 30 TABLET | Refills: 0 | Status: SHIPPED | OUTPATIENT
Start: 2020-08-31 | End: 2020-09-28

## 2020-08-31 RX ORDER — ROSUVASTATIN CALCIUM 5 MG/1
5 TABLET, COATED ORAL DAILY
Qty: 30 TABLET | Refills: 0 | Status: SHIPPED | OUTPATIENT
Start: 2020-08-31 | End: 2020-09-28

## 2020-08-31 NOTE — TELEPHONE ENCOUNTER
Levothyroxine 125 mcg failed protocol due to  Thyroid Supplements Protocol Failed8/30 10:40 AM   TSH test in past 12 months    TSH value between 0.350 and 5.500 IU/ml   Last OV relevant to medication: 3-7-19  Last refill date: 3-12-20 #/refills: 2  When pt was asked to return for OV: 6 mo. Upcoming appt/reason: none  Recent labs: none    Rosuvastatin 5 mg failed protocol due to   Cholesterol Medication Protocol Failed8/30 10:40 AM   ALT < 80    ALT resulted within past year    Lipid panel within past 12 months   Last OV relevant to medication: 2-10-20  Last refill date: 4-7-20 #/refills: 0  When pt was asked to return for OV: 6 mo.   Upcoming appt/reason: none  Recent labs: none    Alprazolam 0.25 mg  Last OV relevant to medication: 3-7-19  Last refill date: 4-7-20 #/refills: 0  When pt was asked to return for OV: none  Upcoming appt/reason: none  Recent labs: none

## 2020-09-02 ENCOUNTER — TELEPHONE (OUTPATIENT)
Dept: INTERNAL MEDICINE CLINIC | Facility: CLINIC | Age: 40
End: 2020-09-02

## 2020-09-03 LAB
ALBUMIN: 4.4 G/DL
ALKALINE PHOSPHATASE: 57
ALT: 18
AST: 17
BILIRUBIN, TOTAL: 0.6 MG/DL
BUN: 12
CALCIUM: 9.4
CHLORIDE: 102
CHOL/HDL RATIO: 3
CHOLESTEROL, TOTAL: 177 MG/DL
CO2: 26
CREATININE: 0.8 MG/DL
EOSINOPHIL %: 3.6
GLUCOSE: 86
HCT: 43.7
HDL CHOLESTEROL: 59 MG/DL
HGB: 15.1
LDL CHOLESTEROL: 99 MG/DL (ref ?–130)
LYMPHOCYTE %: 46.1
MEAN CELL VOLUME: 90
MEAN CORPUSCULAR HEMOGLOBIN: 31.1
MEAN CORPUSCULAR HGB CONC: 34.5
MEAN PLATELET VOLUME: 9
MONOCYTE %: 7
NEUTROPHIL %: 42.3
NUCLEATED RBC: <2
PLT: 331
POTASSIUM: 4.5
RED BLOOD COUNT: 4.86
RED CELL DISTRIBUTION WIDTH: 13.4
SODIUM: 142
T4, FREE: 1.3
THYROXINE (T4): 10.1 ΜG/DL
TOTAL PROTEIN: 7
TRIGLYCERIDES: 95 MG/DL
TSH: 1.8 UIU/ML
VITAMIN D, 25-HYDROXY: 63 NG/ML
WBC: 6.9

## 2020-09-27 DIAGNOSIS — E78.5 HYPERLIPIDEMIA, UNSPECIFIED HYPERLIPIDEMIA TYPE: ICD-10-CM

## 2020-09-27 DIAGNOSIS — E03.9 HYPOTHYROIDISM, UNSPECIFIED TYPE: ICD-10-CM

## 2020-09-28 DIAGNOSIS — G43.909 MIGRAINE WITHOUT STATUS MIGRAINOSUS, NOT INTRACTABLE, UNSPECIFIED MIGRAINE TYPE: ICD-10-CM

## 2020-09-28 RX ORDER — ROSUVASTATIN CALCIUM 5 MG/1
TABLET, COATED ORAL
Qty: 30 TABLET | Refills: 0 | Status: SHIPPED | OUTPATIENT
Start: 2020-09-28 | End: 2020-10-12

## 2020-09-28 RX ORDER — LEVOTHYROXINE SODIUM 0.12 MG/1
TABLET ORAL
Qty: 30 TABLET | Refills: 0 | Status: SHIPPED | OUTPATIENT
Start: 2020-09-28 | End: 2020-10-12

## 2020-09-28 NOTE — TELEPHONE ENCOUNTER
Sent [employer] message stating pt is due for med. F/u. Rosuvastatin 5 mg  Last OV relevant to medication: 2-10-20  Last refill date: 8-31-20 #/refills: 0  When pt was asked to return for OV: 6 mo.   Upcoming appt/reason: none  Recent labs: 9-1-20: Lipid

## 2020-09-28 NOTE — TELEPHONE ENCOUNTER
Rizatriptan Benzoate (MAXALT) 10 MG   amphetamine-dextroamphetamine (ADDERALL) 20 MG   [id number] - [address] AT [facility] [address] [place] [address] Dr, [phone], [phone]

## 2020-09-29 RX ORDER — RIZATRIPTAN BENZOATE 10 MG/1
TABLET ORAL
Qty: 9 TABLET | Refills: 0 | Status: SHIPPED | OUTPATIENT
Start: 2020-09-29 | End: 2021-06-16

## 2020-09-29 NOTE — TELEPHONE ENCOUNTER
Requesting Rizatriptan Benzoate (MAXALT) 10 MG Oral Tab  LOV: 2/10/20  RTC: 6 months  Last Relevant Labs: 9/1/20  Filled: 12/3/18 #9 with 3 refills    Future Appointments   Date Time Provider Gal Merino   10/12/2020  3:00 PM THAIS Kumari

## 2020-10-01 RX ORDER — DEXTROAMPHETAMINE SACCHARATE, AMPHETAMINE ASPARTATE, DEXTROAMPHETAMINE SULFATE AND AMPHETAMINE SULFATE 5; 5; 5; 5 MG/1; MG/1; MG/1; MG/1
TABLET ORAL
Qty: 120 TABLET | Refills: 0 | Status: CANCELLED | OUTPATIENT
Start: 2020-10-01

## 2020-10-02 ENCOUNTER — PATIENT MESSAGE (OUTPATIENT)
Dept: INTERNAL MEDICINE CLINIC | Facility: CLINIC | Age: 40
End: 2020-10-02

## 2020-10-02 RX ORDER — DEXTROAMPHETAMINE SACCHARATE, AMPHETAMINE ASPARTATE, DEXTROAMPHETAMINE SULFATE AND AMPHETAMINE SULFATE 5; 5; 5; 5 MG/1; MG/1; MG/1; MG/1
TABLET ORAL
Qty: 120 TABLET | Refills: 0 | OUTPATIENT
Start: 2020-10-02 | End: 2020-10-27

## 2020-10-02 NOTE — TELEPHONE ENCOUNTER
Duplicate request. Replied back to pt to let her know this is a duplicate request and are waiting on [employer] response for refill.

## 2020-10-02 NOTE — TELEPHONE ENCOUNTER
Adderall 20 mg  Last OV relevant to medication: 2-10-20  Last refill date: 8-9-20 #/refills: 0  When pt was asked to return for OV: 6 mo.   Upcoming appt/reason: 10-12-20  Recent labs: none

## 2020-10-02 NOTE — TELEPHONE ENCOUNTER
From: Alex Howard  To: THAIS Bernardo  Sent: 10/2/2020 11:13 AM CDT  Subject: Prescription Question    Wanted to confirm you received the request for the refill on the generic Adderall that I submitted yesterday.  I went to  my RXs but they

## 2020-10-12 ENCOUNTER — OFFICE VISIT (OUTPATIENT)
Dept: INTERNAL MEDICINE CLINIC | Facility: CLINIC | Age: 40
End: 2020-10-12
Payer: COMMERCIAL

## 2020-10-12 VITALS
HEIGHT: 63 IN | BODY MASS INDEX: 32.96 KG/M2 | RESPIRATION RATE: 16 BRPM | DIASTOLIC BLOOD PRESSURE: 64 MMHG | WEIGHT: 186 LBS | SYSTOLIC BLOOD PRESSURE: 112 MMHG | TEMPERATURE: 98 F

## 2020-10-12 DIAGNOSIS — E78.5 HYPERLIPIDEMIA, UNSPECIFIED HYPERLIPIDEMIA TYPE: Primary | ICD-10-CM

## 2020-10-12 DIAGNOSIS — K21.9 GASTROESOPHAGEAL REFLUX DISEASE WITHOUT ESOPHAGITIS: ICD-10-CM

## 2020-10-12 DIAGNOSIS — F98.8 ATTENTION DEFICIT DISORDER (ADD) WITHOUT HYPERACTIVITY: ICD-10-CM

## 2020-10-12 DIAGNOSIS — F41.9 ANXIETY: ICD-10-CM

## 2020-10-12 DIAGNOSIS — E03.9 HYPOTHYROIDISM, UNSPECIFIED TYPE: ICD-10-CM

## 2020-10-12 PROCEDURE — 3074F SYST BP LT 130 MM HG: CPT | Performed by: FAMILY MEDICINE

## 2020-10-12 PROCEDURE — 3078F DIAST BP <80 MM HG: CPT | Performed by: FAMILY MEDICINE

## 2020-10-12 PROCEDURE — 3008F BODY MASS INDEX DOCD: CPT | Performed by: FAMILY MEDICINE

## 2020-10-12 PROCEDURE — 99214 OFFICE O/P EST MOD 30 MIN: CPT | Performed by: FAMILY MEDICINE

## 2020-10-12 RX ORDER — ROSUVASTATIN CALCIUM 5 MG/1
5 TABLET, COATED ORAL DAILY
Qty: 90 TABLET | Refills: 1 | Status: SHIPPED | OUTPATIENT
Start: 2020-10-12 | End: 2021-06-16

## 2020-10-12 RX ORDER — PANTOPRAZOLE SODIUM 40 MG/1
40 TABLET, DELAYED RELEASE ORAL 2 TIMES DAILY
Qty: 180 TABLET | Refills: 1 | Status: SHIPPED | OUTPATIENT
Start: 2020-10-12 | End: 2021-06-16

## 2020-10-12 RX ORDER — LEVOTHYROXINE SODIUM 0.12 MG/1
TABLET ORAL
Qty: 90 TABLET | Refills: 1 | Status: SHIPPED | OUTPATIENT
Start: 2020-10-12 | End: 2021-04-01

## 2020-10-12 RX ORDER — ALPRAZOLAM 0.25 MG/1
TABLET ORAL
Qty: 30 TABLET | Refills: 0 | Status: SHIPPED | OUTPATIENT
Start: 2020-10-12 | End: 2021-02-24

## 2020-10-12 NOTE — PROGRESS NOTES
CC: KENNY       Sahara Serna is a 38year old female who presents for recheck on  hyperlipidemia. All labs up to date. Pt gets them done at her job and then faxes them to us. Currently asymptomatic, no chest pains, jaw pains, arm pains. No skin lesions. 199     HDL Cholesterol (mg/dL)   Date Value   09/01/2020 59   07/10/2019 54   03/06/2019 46     LDL Cholesterol (mg/dL)   Date Value   09/01/2020 99   07/10/2019 103   03/06/2019 137     Triglycerides (mg/dL)   Date Value   09/01/2020 95   07/10/2019 106 01/16/2018      Social History:   Social History    Tobacco Use      Smoking status: Never Smoker      Smokeless tobacco: Never Used    Alcohol use: Yes      Frequency: Monthly or less      Drinks per session: 1 or 2      Comment: occ    Drug use:  No diagnosis)  Hypothyroidism, unspecified type  Gastroesophageal reflux disease without esophagitis  Attention deficit disorder (add) without hyperactivity  Anxiety    No orders of the defined types were placed in this encounter.       Meds & Refills for this and agrees to the plan.   The patient is asked to return in 6 months

## 2020-11-02 RX ORDER — DEXTROAMPHETAMINE SACCHARATE, AMPHETAMINE ASPARTATE, DEXTROAMPHETAMINE SULFATE AND AMPHETAMINE SULFATE 5; 5; 5; 5 MG/1; MG/1; MG/1; MG/1
TABLET ORAL
Qty: 120 TABLET | Refills: 0 | Status: SHIPPED | OUTPATIENT
Start: 2020-11-02 | End: 2020-12-02

## 2020-11-02 RX ORDER — DEXTROAMPHETAMINE SACCHARATE, AMPHETAMINE ASPARTATE, DEXTROAMPHETAMINE SULFATE AND AMPHETAMINE SULFATE 5; 5; 5; 5 MG/1; MG/1; MG/1; MG/1
TABLET ORAL
Qty: 120 TABLET | Refills: 0 | Status: SHIPPED | OUTPATIENT
Start: 2021-01-03 | End: 2021-01-27

## 2020-11-02 RX ORDER — DEXTROAMPHETAMINE SACCHARATE, AMPHETAMINE ASPARTATE, DEXTROAMPHETAMINE SULFATE AND AMPHETAMINE SULFATE 5; 5; 5; 5 MG/1; MG/1; MG/1; MG/1
TABLET ORAL
Qty: 120 TABLET | Refills: 0 | Status: SHIPPED | OUTPATIENT
Start: 2020-12-03 | End: 2021-01-02

## 2020-11-02 RX ORDER — DEXTROAMPHETAMINE SACCHARATE, AMPHETAMINE ASPARTATE, DEXTROAMPHETAMINE SULFATE AND AMPHETAMINE SULFATE 5; 5; 5; 5 MG/1; MG/1; MG/1; MG/1
TABLET ORAL
Qty: 120 TABLET | Refills: 0 | Status: CANCELLED | OUTPATIENT
Start: 2020-11-02

## 2020-11-02 NOTE — TELEPHONE ENCOUNTER
Adderall 20 mg- Pt states to remind you that she needs 3 month supply  Last OV relevant to medication: 10-12-20  Last refill date: 10-5-20 #/refills: 0  When pt was asked to return for OV: 6 mo.   Upcoming appt/reason: none  Recent labs: none

## 2021-01-28 RX ORDER — DEXTROAMPHETAMINE SACCHARATE, AMPHETAMINE ASPARTATE, DEXTROAMPHETAMINE SULFATE AND AMPHETAMINE SULFATE 5; 5; 5; 5 MG/1; MG/1; MG/1; MG/1
TABLET ORAL
Qty: 120 TABLET | Refills: 0 | Status: SHIPPED | OUTPATIENT
Start: 2021-01-28 | End: 2021-02-24

## 2021-01-28 NOTE — TELEPHONE ENCOUNTER
Adderall 20 mg: pt requesting 3 refills with this  Filled 1-3-21  Qty 120  0 refills  No upcoming appt.    LOV 10-12-20

## 2021-02-24 DIAGNOSIS — F41.9 ANXIETY: ICD-10-CM

## 2021-02-25 RX ORDER — DEXTROAMPHETAMINE SACCHARATE, AMPHETAMINE ASPARTATE, DEXTROAMPHETAMINE SULFATE AND AMPHETAMINE SULFATE 5; 5; 5; 5 MG/1; MG/1; MG/1; MG/1
TABLET ORAL
Qty: 120 TABLET | Refills: 0 | Status: SHIPPED | OUTPATIENT
Start: 2021-02-25 | End: 2021-03-22

## 2021-02-25 RX ORDER — ALPRAZOLAM 0.25 MG/1
TABLET ORAL
Qty: 30 TABLET | Refills: 0 | Status: SHIPPED | OUTPATIENT
Start: 2021-02-25 | End: 2021-06-16

## 2021-03-23 RX ORDER — DEXTROAMPHETAMINE SACCHARATE, AMPHETAMINE ASPARTATE, DEXTROAMPHETAMINE SULFATE AND AMPHETAMINE SULFATE 5; 5; 5; 5 MG/1; MG/1; MG/1; MG/1
TABLET ORAL
Qty: 120 TABLET | Refills: 0 | Status: SHIPPED | OUTPATIENT
Start: 2021-03-23 | End: 2021-04-19

## 2021-03-23 NOTE — TELEPHONE ENCOUNTER
LOV: 10/12/2020 with THAIS Horton  RTC: 6 months  Last Relevant Labs: 9/1/2020   Filled: 2/25/2021  #120 with 0 refills    Future Appointments   Date Time Provider Gal Merino   3/29/2021  6:40 PM PF EULALIO [facility] [place]   4/8/2021 12:15

## 2021-03-29 ENCOUNTER — HOSPITAL ENCOUNTER (OUTPATIENT)
Dept: MAMMOGRAPHY | Age: 41
Discharge: HOME | End: 2021-03-29
Attending: FAMILY MEDICINE
Payer: COMMERCIAL

## 2021-03-29 DIAGNOSIS — Z12.31 ENCOUNTER FOR SCREENING MAMMOGRAM FOR MALIGNANT NEOPLASM OF BREAST: ICD-10-CM

## 2021-03-29 PROCEDURE — 77063 BREAST TOMOSYNTHESIS BI: CPT | Performed by: NURSE PRACTITIONER

## 2021-03-29 PROCEDURE — 77067 SCR MAMMO BI INCL CAD: CPT | Performed by: NURSE PRACTITIONER

## 2021-04-01 DIAGNOSIS — E03.9 HYPOTHYROIDISM, UNSPECIFIED TYPE: ICD-10-CM

## 2021-04-01 RX ORDER — LEVOTHYROXINE SODIUM 0.12 MG/1
TABLET ORAL
Qty: 90 TABLET | Refills: 1 | Status: SHIPPED | OUTPATIENT
Start: 2021-04-01

## 2021-04-01 NOTE — TELEPHONE ENCOUNTER
Name from pharmacy: LEVOTHYROXINE 0.125MG (125MCG) TAB          Will file in chart as: LEVOTHYROXINE SODIUM 125 MCG Oral Tab    Sig: TAKE 1 TABLET BY MOUTH BEFORE BREAKFAST    Disp:  90 tablet    Refills:  1 (Pharmacy requested: Not specified)    Start: 4/

## 2021-04-02 ENCOUNTER — HOSPITAL ENCOUNTER (OUTPATIENT)
Dept: ULTRASOUND IMAGING | Age: 41
Discharge: HOME | End: 2021-04-02
Attending: NURSE PRACTITIONER
Payer: COMMERCIAL

## 2021-04-02 ENCOUNTER — HOSPITAL ENCOUNTER (OUTPATIENT)
Dept: MAMMOGRAPHY | Age: 41
Discharge: HOME | End: 2021-04-02
Attending: NURSE PRACTITIONER
Payer: COMMERCIAL

## 2021-04-02 DIAGNOSIS — R92.2 INCONCLUSIVE MAMMOGRAM: ICD-10-CM

## 2021-04-02 PROCEDURE — 77065 DX MAMMO INCL CAD UNI: CPT | Performed by: NURSE PRACTITIONER

## 2021-04-02 PROCEDURE — 77061 BREAST TOMOSYNTHESIS UNI: CPT | Performed by: NURSE PRACTITIONER

## 2021-04-02 PROCEDURE — 76642 ULTRASOUND BREAST LIMITED: CPT | Performed by: NURSE PRACTITIONER

## 2021-04-08 ENCOUNTER — OFFICE VISIT (OUTPATIENT)
Dept: OBGYN CLINIC | Facility: CLINIC | Age: 41
End: 2021-04-08
Payer: COMMERCIAL

## 2021-04-08 VITALS
WEIGHT: 189.81 LBS | HEART RATE: 79 BPM | SYSTOLIC BLOOD PRESSURE: 124 MMHG | BODY MASS INDEX: 33.63 KG/M2 | DIASTOLIC BLOOD PRESSURE: 80 MMHG | HEIGHT: 63 IN

## 2021-04-08 DIAGNOSIS — Z12.4 CERVICAL CANCER SCREENING: ICD-10-CM

## 2021-04-08 DIAGNOSIS — Z01.419 WELL WOMAN EXAM WITH ROUTINE GYNECOLOGICAL EXAM: Primary | ICD-10-CM

## 2021-04-08 PROCEDURE — 87624 HPV HI-RISK TYP POOLED RSLT: CPT | Performed by: NURSE PRACTITIONER

## 2021-04-08 PROCEDURE — 3079F DIAST BP 80-89 MM HG: CPT | Performed by: NURSE PRACTITIONER

## 2021-04-08 PROCEDURE — 99396 PREV VISIT EST AGE 40-64: CPT | Performed by: NURSE PRACTITIONER

## 2021-04-08 PROCEDURE — 3008F BODY MASS INDEX DOCD: CPT | Performed by: NURSE PRACTITIONER

## 2021-04-08 PROCEDURE — 3074F SYST BP LT 130 MM HG: CPT | Performed by: NURSE PRACTITIONER

## 2021-04-08 NOTE — PROGRESS NOTES
Here for Routine Annual Exam  No concerns or questions. Menses are light/ irregular. Contraception- Mirena- placed 2018.   Recent mammogram with benign cyst  No C/O- sister recently diagnosed with endometrial cancer- age 52- lives in [address] [facility]    ROS: N

## 2021-05-19 DIAGNOSIS — G43.909 MIGRAINE WITHOUT STATUS MIGRAINOSUS, NOT INTRACTABLE, UNSPECIFIED MIGRAINE TYPE: ICD-10-CM

## 2021-05-19 DIAGNOSIS — F41.9 ANXIETY: ICD-10-CM

## 2021-05-20 RX ORDER — RIZATRIPTAN BENZOATE 10 MG/1
TABLET ORAL
Qty: 9 TABLET | Refills: 0 | OUTPATIENT
Start: 2021-05-20

## 2021-05-20 RX ORDER — ALPRAZOLAM 0.25 MG/1
TABLET ORAL
Qty: 30 TABLET | Refills: 0 | OUTPATIENT
Start: 2021-05-20

## 2021-06-14 ENCOUNTER — TELEPHONE (OUTPATIENT)
Dept: INTERNAL MEDICINE CLINIC | Facility: CLINIC | Age: 41
End: 2021-06-14

## 2021-06-16 ENCOUNTER — OFFICE VISIT (OUTPATIENT)
Dept: INTERNAL MEDICINE CLINIC | Facility: CLINIC | Age: 41
End: 2021-06-16
Payer: COMMERCIAL

## 2021-06-16 VITALS
OXYGEN SATURATION: 99 % | HEIGHT: 63 IN | TEMPERATURE: 99 F | DIASTOLIC BLOOD PRESSURE: 78 MMHG | HEART RATE: 98 BPM | BODY MASS INDEX: 34.11 KG/M2 | WEIGHT: 192.5 LBS | RESPIRATION RATE: 16 BRPM | SYSTOLIC BLOOD PRESSURE: 110 MMHG

## 2021-06-16 DIAGNOSIS — F41.9 ANXIETY: ICD-10-CM

## 2021-06-16 DIAGNOSIS — E78.5 HYPERLIPIDEMIA, UNSPECIFIED HYPERLIPIDEMIA TYPE: ICD-10-CM

## 2021-06-16 DIAGNOSIS — K21.9 GASTROESOPHAGEAL REFLUX DISEASE WITHOUT ESOPHAGITIS: ICD-10-CM

## 2021-06-16 DIAGNOSIS — G43.909 MIGRAINE WITHOUT STATUS MIGRAINOSUS, NOT INTRACTABLE, UNSPECIFIED MIGRAINE TYPE: ICD-10-CM

## 2021-06-16 DIAGNOSIS — E03.9 HYPOTHYROIDISM, UNSPECIFIED TYPE: Primary | ICD-10-CM

## 2021-06-16 DIAGNOSIS — F98.8 ATTENTION DEFICIT DISORDER (ADD) WITHOUT HYPERACTIVITY: ICD-10-CM

## 2021-06-16 PROCEDURE — 3074F SYST BP LT 130 MM HG: CPT | Performed by: FAMILY MEDICINE

## 2021-06-16 PROCEDURE — 3078F DIAST BP <80 MM HG: CPT | Performed by: FAMILY MEDICINE

## 2021-06-16 PROCEDURE — 99214 OFFICE O/P EST MOD 30 MIN: CPT | Performed by: FAMILY MEDICINE

## 2021-06-16 PROCEDURE — 3008F BODY MASS INDEX DOCD: CPT | Performed by: FAMILY MEDICINE

## 2021-06-16 RX ORDER — PANTOPRAZOLE SODIUM 40 MG/1
40 TABLET, DELAYED RELEASE ORAL 2 TIMES DAILY
Qty: 180 TABLET | Refills: 1 | Status: SHIPPED | OUTPATIENT
Start: 2021-06-16 | End: 2021-09-13

## 2021-06-16 RX ORDER — ROSUVASTATIN CALCIUM 5 MG/1
5 TABLET, COATED ORAL DAILY
Qty: 90 TABLET | Refills: 1 | Status: SHIPPED | OUTPATIENT
Start: 2021-06-16 | End: 2021-08-25

## 2021-06-16 RX ORDER — LEVOTHYROXINE SODIUM 0.12 MG/1
125 TABLET ORAL
Qty: 90 TABLET | Refills: 1 | Status: CANCELLED | OUTPATIENT
Start: 2021-06-16

## 2021-06-16 RX ORDER — DEXTROAMPHETAMINE SACCHARATE, AMPHETAMINE ASPARTATE, DEXTROAMPHETAMINE SULFATE AND AMPHETAMINE SULFATE 5; 5; 5; 5 MG/1; MG/1; MG/1; MG/1
TABLET ORAL
Qty: 120 TABLET | Refills: 0 | Status: CANCELLED | OUTPATIENT
Start: 2021-06-16 | End: 2021-07-17

## 2021-06-16 RX ORDER — DEXTROAMPHETAMINE SACCHARATE, AMPHETAMINE ASPARTATE, DEXTROAMPHETAMINE SULFATE AND AMPHETAMINE SULFATE 5; 5; 5; 5 MG/1; MG/1; MG/1; MG/1
TABLET ORAL
Qty: 120 TABLET | Refills: 0 | Status: SHIPPED | OUTPATIENT
Start: 2021-06-16 | End: 2021-07-16

## 2021-06-16 RX ORDER — ALPRAZOLAM 0.25 MG/1
TABLET ORAL
Qty: 30 TABLET | Refills: 0 | Status: SHIPPED | OUTPATIENT
Start: 2021-06-16 | End: 2022-01-13

## 2021-06-16 RX ORDER — DEXTROAMPHETAMINE SACCHARATE, AMPHETAMINE ASPARTATE, DEXTROAMPHETAMINE SULFATE AND AMPHETAMINE SULFATE 5; 5; 5; 5 MG/1; MG/1; MG/1; MG/1
TABLET ORAL
Qty: 120 TABLET | Refills: 0 | Status: SHIPPED | OUTPATIENT
Start: 2021-07-17 | End: 2021-08-16

## 2021-06-16 RX ORDER — RIZATRIPTAN BENZOATE 10 MG/1
TABLET ORAL
Qty: 9 TABLET | Refills: 1 | Status: SHIPPED | OUTPATIENT
Start: 2021-06-16

## 2021-06-16 RX ORDER — DEXTROAMPHETAMINE SACCHARATE, AMPHETAMINE ASPARTATE, DEXTROAMPHETAMINE SULFATE AND AMPHETAMINE SULFATE 5; 5; 5; 5 MG/1; MG/1; MG/1; MG/1
TABLET ORAL
Qty: 120 TABLET | Refills: 0 | Status: SHIPPED | OUTPATIENT
Start: 2021-08-17 | End: 2021-09-13

## 2021-06-16 NOTE — PROGRESS NOTES
CC: KENNY Ariasdeepthi Mg is a 38year old female who presents for recheck on  hyperlipidemia. Currently asymptomatic, no chest pains, jaw pains, arm pains. No skin lesions.   No SOB on exertion or rest. Patient denies any myalgias or arthralgias on has been good. Patient's appetite is good. Pt denies headaches,tics,or insomnia. No suicidal ideations. Would like to continue the same medication. Does not want to see a counselor now.         Wt Readings from Last 6 Encounters:  06/16/21 : 192 lb 8 oz (87.3 History:   Diagnosis Date   • Cervical polyp 05/18/2016   • Encounter for insertion of mirena IUD 01/28/2013   • Encounter for screening for human papillomavirus (HPV) 05/16/2016    NEGATIVE   • General counseling for prescription of oral contraceptives no cyanosis, clubbing   NEURO: Alert & Oriented x 3. CN II-XII intact. Moving all 4 extremities without difficulty. Gait and station normal.   No facial droop. No slurred speech. Muscle tone and strength normal and symmetric.  Motor and sensation grossly n

## 2021-07-14 ENCOUNTER — PATIENT MESSAGE (OUTPATIENT)
Dept: INTERNAL MEDICINE CLINIC | Facility: CLINIC | Age: 41
End: 2021-07-14

## 2021-07-14 ENCOUNTER — TELEPHONE (OUTPATIENT)
Dept: INTERNAL MEDICINE CLINIC | Facility: CLINIC | Age: 41
End: 2021-07-14

## 2021-07-14 LAB
T4, FREE: 0.8 NG/DL
THYROXINE (T4): 10.2 UG/DL
TSH: 12.1 UIU/ML

## 2021-07-14 RX ORDER — LEVOTHYROXINE SODIUM 137 UG/1
137 TABLET ORAL
Qty: 30 TABLET | Refills: 1 | Status: SHIPPED | OUTPATIENT
Start: 2021-07-14 | End: 2022-01-13

## 2021-07-14 NOTE — TELEPHONE ENCOUNTER
Incoming fax from [facility] with thyroid panel results   Entered into epic   Placed in  in-basket for review

## 2021-07-14 NOTE — TELEPHONE ENCOUNTER
From: Tiffanie Gee  To: THAIS Hirsch  Sent: 7/14/2021 12:44 PM CDT  Subject: Prescription Question    Hi,  Can you call the [facility] to approve my Adderall to get filled for tomorrow 7/15?      I picked up my last one on 6/17/21 and they're tell

## 2021-07-14 NOTE — TELEPHONE ENCOUNTER
TSH  12.10 which is up on 7/12/21. Lets increase her levothyroxine to 137 mcg daily #30 with 1 refill. Recheck TSH in 6-8 weeks. Script sent to her [facility].

## 2021-08-17 ENCOUNTER — PATIENT MESSAGE (OUTPATIENT)
Dept: INTERNAL MEDICINE CLINIC | Facility: CLINIC | Age: 41
End: 2021-08-17

## 2021-08-17 NOTE — TELEPHONE ENCOUNTER
Prior authorization submitted through covermymeds. Qureshi: [address]  Your information has been submitted to [employer]. [employer] is reviewing the PA request and you will receive an electronic response.  You may check for the updated outcome later by re

## 2021-08-17 NOTE — TELEPHONE ENCOUNTER
Laura Pascual RN 2021 8:43 AM CDT      ----- Message -----  From: Derryl Dandy  Sent: 2021 6:36 AM CDT  To: [facility] Clinical Staff  Subject: Prescription Question     The Adderall RX needs a new prior auth, as it .  Can you submit the

## 2021-08-18 DIAGNOSIS — E03.9 HYPOTHYROIDISM, UNSPECIFIED TYPE: ICD-10-CM

## 2021-08-18 RX ORDER — LEVOTHYROXINE SODIUM 137 UG/1
TABLET ORAL
Qty: 30 TABLET | Refills: 1 | OUTPATIENT
Start: 2021-08-18

## 2021-08-18 NOTE — TELEPHONE ENCOUNTER
Pt needs to recheck Thyroid levels. Sent [employer] message    Levothyroxine 137 mcg failed protocol due to  Thyroid Supplements Protocol [id number]/2021 08:02 AM   TSH value between 0.350 and 5.500 IU/ml   Filled 7-14-21  Qty 30  1 refill  No upcoming appt.

## 2021-08-25 DIAGNOSIS — E78.5 HYPERLIPIDEMIA, UNSPECIFIED HYPERLIPIDEMIA TYPE: ICD-10-CM

## 2021-08-26 RX ORDER — ROSUVASTATIN CALCIUM 5 MG/1
5 TABLET, COATED ORAL DAILY
Qty: 90 TABLET | Refills: 1 | Status: SHIPPED | OUTPATIENT
Start: 2021-08-26 | End: 2021-09-13

## 2021-09-07 ENCOUNTER — TELEPHONE (OUTPATIENT)
Dept: INTERNAL MEDICINE CLINIC | Facility: CLINIC | Age: 41
End: 2021-09-07

## 2021-09-07 ENCOUNTER — PATIENT MESSAGE (OUTPATIENT)
Dept: INTERNAL MEDICINE CLINIC | Facility: CLINIC | Age: 41
End: 2021-09-07

## 2021-09-07 NOTE — TELEPHONE ENCOUNTER
Pt requesting in office appointment for ear pain, headaches and broken blood vessels in her eye, patient is vaccinated per epic. [id number] Sandy Guzman for in office?

## 2021-09-07 NOTE — TELEPHONE ENCOUNTER
From: Fiona Christianson  To:  THAIS Olvera  Sent: 9/7/2021 3:20 PM CDT  Subject: Other    I called earlier to make an appointment and was told that someone would call me back because a message would need to be sent to see if it should be in person or

## 2021-09-07 NOTE — TELEPHONE ENCOUNTER
Patient reports s/s starting Sunday    Headache - otc ibuprofen for relief  Bilateral ear pain   Broken blood vessels to left eye, redness to sclera present     Denies vision changes, light-headedness, dizziness, weakness, body aches, chills, diarrhea, vom

## 2021-09-13 DIAGNOSIS — K21.9 GASTROESOPHAGEAL REFLUX DISEASE WITHOUT ESOPHAGITIS: ICD-10-CM

## 2021-09-13 DIAGNOSIS — E78.5 HYPERLIPIDEMIA, UNSPECIFIED HYPERLIPIDEMIA TYPE: ICD-10-CM

## 2021-09-13 RX ORDER — DEXTROAMPHETAMINE SACCHARATE, AMPHETAMINE ASPARTATE, DEXTROAMPHETAMINE SULFATE AND AMPHETAMINE SULFATE 5; 5; 5; 5 MG/1; MG/1; MG/1; MG/1
TABLET ORAL
Qty: 120 TABLET | Refills: 0 | Status: SHIPPED | OUTPATIENT
Start: 2021-09-13 | End: 2021-10-13

## 2021-09-13 RX ORDER — ROSUVASTATIN CALCIUM 5 MG/1
5 TABLET, COATED ORAL DAILY
Qty: 90 TABLET | Refills: 1 | Status: SHIPPED | OUTPATIENT
Start: 2021-09-13 | End: 2022-01-13

## 2021-09-13 RX ORDER — PANTOPRAZOLE SODIUM 40 MG/1
40 TABLET, DELAYED RELEASE ORAL 2 TIMES DAILY
Qty: 180 TABLET | Refills: 1 | Status: SHIPPED | OUTPATIENT
Start: 2021-09-13 | End: 2022-01-13

## 2021-09-13 NOTE — TELEPHONE ENCOUNTER
pantoprazole 40 MG Oral Tab EC         Sig: Take 1 tablet (40 mg total) by mouth 2 (two) times daily.     Disp:  180 tablet    Refills:  1    Start: 9/13/2021    Class: Normal    Non-formulary For: Gastroesophageal reflux disease without esophagitis    Las

## 2021-10-05 NOTE — TELEPHONE ENCOUNTER
Adderall scripts done
Pt states her  will  Rx, she is aware it is at the front office.
RX picked up by self
can I get 3 months so 3 separate written RX  Adderall  Last OV relevant to medication: 3-7-19  Last refill date: 5-8-19   #/refills: 0  When pt was asked to return for OV: 6months  Upcoming appt/reason: none  Recent labs: none
Show Aperture Variable?: Yes
Duration Of Freeze Thaw-Cycle (Seconds): 5
Render Post-Care Instructions In Note?: no
Detail Level: Simple
Number Of Freeze-Thaw Cycles: 2 freeze-thaw cycles
Post-Care Instructions: I reviewed with the patient in detail post-care instructions. Patient is to wear sunprotection, and avoid picking at any of the treated lesions. Pt may apply Vaseline to crusted or scabbing areas.
Consent: The patient's consent was obtained including but not limited to risks of crusting, scabbing, blistering, scarring, darker or lighter pigmentary change, recurrence, incomplete removal and infection.

## 2021-10-13 ENCOUNTER — PATIENT MESSAGE (OUTPATIENT)
Dept: INTERNAL MEDICINE CLINIC | Facility: CLINIC | Age: 41
End: 2021-10-13

## 2021-10-13 RX ORDER — DEXTROAMPHETAMINE SACCHARATE, AMPHETAMINE ASPARTATE, DEXTROAMPHETAMINE SULFATE AND AMPHETAMINE SULFATE 5; 5; 5; 5 MG/1; MG/1; MG/1; MG/1
TABLET ORAL
Qty: 120 TABLET | Refills: 0 | Status: CANCELLED | OUTPATIENT
Start: 2021-10-13 | End: 2021-11-12

## 2021-10-14 ENCOUNTER — PATIENT MESSAGE (OUTPATIENT)
Dept: INTERNAL MEDICINE CLINIC | Facility: CLINIC | Age: 41
End: 2021-10-14

## 2021-10-14 RX ORDER — DEXTROAMPHETAMINE SACCHARATE, AMPHETAMINE ASPARTATE, DEXTROAMPHETAMINE SULFATE AND AMPHETAMINE SULFATE 5; 5; 5; 5 MG/1; MG/1; MG/1; MG/1
TABLET ORAL
Qty: 120 TABLET | Refills: 0 | Status: SHIPPED | OUTPATIENT
Start: 2021-11-14 | End: 2021-12-14

## 2021-10-14 RX ORDER — DEXTROAMPHETAMINE SACCHARATE, AMPHETAMINE ASPARTATE, DEXTROAMPHETAMINE SULFATE AND AMPHETAMINE SULFATE 5; 5; 5; 5 MG/1; MG/1; MG/1; MG/1
TABLET ORAL
Qty: 120 TABLET | Refills: 0 | Status: SHIPPED | OUTPATIENT
Start: 2021-12-15 | End: 2022-01-14

## 2021-10-14 RX ORDER — DEXTROAMPHETAMINE SACCHARATE, AMPHETAMINE ASPARTATE, DEXTROAMPHETAMINE SULFATE AND AMPHETAMINE SULFATE 5; 5; 5; 5 MG/1; MG/1; MG/1; MG/1
TABLET ORAL
Qty: 120 TABLET | Refills: 0 | Status: SHIPPED | OUTPATIENT
Start: 2021-10-14 | End: 2021-11-13

## 2021-10-14 NOTE — TELEPHONE ENCOUNTER
From: Amita Marinelli  To: THAIS Lopez  Sent: 10/13/2021 4:28 PM CDT  Subject: RX    I called [facility] today to fill my RX for the generic Adderall and they told me there wasn’t one to fill.  The last refill request was 9/13 and was supposed to be

## 2021-11-11 NOTE — TELEPHONE ENCOUNTER
From: Sahara Serna  To: THAIS Caraballo  Sent: 10/13/2021 4:28 PM CDT  Subject: RX    I called [facility] today to fill my RX for the generic Adderall and they told me there wasn’t one to fill.  The last refill request was 9/13 and was supposed to be

## 2021-11-11 NOTE — TELEPHONE ENCOUNTER
Spoke To Eliezer from the pharmacy,   States that refill cannot be filled until the 14th of this month.     Replied back to [employer], letting pt know

## 2022-01-13 DIAGNOSIS — F41.9 ANXIETY: ICD-10-CM

## 2022-01-13 DIAGNOSIS — E78.5 HYPERLIPIDEMIA, UNSPECIFIED HYPERLIPIDEMIA TYPE: ICD-10-CM

## 2022-01-13 DIAGNOSIS — K21.9 GASTROESOPHAGEAL REFLUX DISEASE WITHOUT ESOPHAGITIS: ICD-10-CM

## 2022-01-13 RX ORDER — PANTOPRAZOLE SODIUM 40 MG/1
40 TABLET, DELAYED RELEASE ORAL 2 TIMES DAILY
Qty: 180 TABLET | Refills: 0 | Status: SHIPPED | OUTPATIENT
Start: 2022-01-13

## 2022-01-13 RX ORDER — ALPRAZOLAM 0.25 MG/1
TABLET ORAL
Qty: 30 TABLET | Refills: 0 | OUTPATIENT
Start: 2022-01-13

## 2022-01-13 RX ORDER — ROSUVASTATIN CALCIUM 5 MG/1
5 TABLET, COATED ORAL DAILY
Qty: 90 TABLET | Refills: 0 | Status: SHIPPED | OUTPATIENT
Start: 2022-01-13

## 2022-01-13 RX ORDER — ALPRAZOLAM 0.25 MG/1
TABLET ORAL
Qty: 30 TABLET | Refills: 0 | Status: SHIPPED | OUTPATIENT
Start: 2022-01-13 | End: 2022-06-13

## 2022-01-13 RX ORDER — LEVOTHYROXINE SODIUM 137 UG/1
137 TABLET ORAL
Qty: 30 TABLET | Refills: 0 | Status: SHIPPED | OUTPATIENT
Start: 2022-01-13

## 2022-01-14 ENCOUNTER — IMMUNIZATION (OUTPATIENT)
Dept: LAB | Facility: HOSPITAL | Age: 42
End: 2022-01-14
Attending: EMERGENCY MEDICINE
Payer: COMMERCIAL

## 2022-01-14 DIAGNOSIS — Z23 NEED FOR VACCINATION: Primary | ICD-10-CM

## 2022-01-14 PROCEDURE — 0064A SARSCOV2 VAC 50MCG/0.25ML IM: CPT

## 2022-02-09 NOTE — TELEPHONE ENCOUNTER
Sent [employer] message to pt to complete thyroid lab. Levothyroxine 137 mcg failed protocol due to  Thyroid Supplements Protocol Failed 02/08/2022 09:52 PM   Protocol Details  TSH value between 0.350 and 5.500 IU/ml   Filled 1-13-22  Qty 30  0 refills  No upcoming appt.    LOV 6-16-21

## 2022-02-10 RX ORDER — LEVOTHYROXINE SODIUM 137 UG/1
TABLET ORAL
Qty: 30 TABLET | Refills: 0 | Status: SHIPPED | OUTPATIENT
Start: 2022-02-10 | End: 2022-03-17

## 2022-02-13 RX ORDER — DEXTROAMPHETAMINE SACCHARATE, AMPHETAMINE ASPARTATE, DEXTROAMPHETAMINE SULFATE AND AMPHETAMINE SULFATE 5; 5; 5; 5 MG/1; MG/1; MG/1; MG/1
TABLET ORAL
Qty: 120 TABLET | Refills: 0 | OUTPATIENT
Start: 2022-02-13 | End: 2022-03-14

## 2022-02-15 ENCOUNTER — PATIENT MESSAGE (OUTPATIENT)
Dept: INTERNAL MEDICINE CLINIC | Facility: CLINIC | Age: 42
End: 2022-02-15

## 2022-02-15 RX ORDER — DEXTROAMPHETAMINE SACCHARATE, AMPHETAMINE ASPARTATE, DEXTROAMPHETAMINE SULFATE AND AMPHETAMINE SULFATE 5; 5; 5; 5 MG/1; MG/1; MG/1; MG/1
TABLET ORAL
Qty: 120 TABLET | Refills: 0 | OUTPATIENT
Start: 2022-02-15 | End: 2022-03-17

## 2022-02-16 RX ORDER — DEXTROAMPHETAMINE SACCHARATE, AMPHETAMINE ASPARTATE, DEXTROAMPHETAMINE SULFATE AND AMPHETAMINE SULFATE 5; 5; 5; 5 MG/1; MG/1; MG/1; MG/1
TABLET ORAL
Qty: 120 TABLET | Refills: 0 | Status: SHIPPED | OUTPATIENT
Start: 2022-02-16 | End: 2022-03-18

## 2022-02-16 NOTE — TELEPHONE ENCOUNTER
Pt due for an appt. Pt last seen 6/2021. I will ok one month once she makes an appt. . I can then do 3 month refill

## 2022-02-16 NOTE — TELEPHONE ENCOUNTER
Appt scheduled. Future Appointments   Date Time Provider Gal Merino   2/28/2022  2:00 PM Marky Dewey, THAIS [facility] [place]     Pt wanted to let Cristofer Menjivar know she is getting her labs done and she would like a call to let her know once refill has been sent.

## 2022-02-16 NOTE — TELEPHONE ENCOUNTER
I contacted pharmacy to clarify if the rx from 2/11/22 is refillable. Per pharmacist Brandon Kaye, the original Christella Codding was sent 11/13/21, which is only valid for 90 days from RX date. RX pending for 1 month supply per  message below.

## 2022-03-17 RX ORDER — LEVOTHYROXINE SODIUM 137 UG/1
TABLET ORAL
Qty: 30 TABLET | Refills: 0 | Status: SHIPPED | OUTPATIENT
Start: 2022-03-17

## 2022-03-17 NOTE — TELEPHONE ENCOUNTER
Sent mychart message to pt stating she is due for repeat Thyroid lab work.    1 month sent to pharmacy

## 2022-05-04 ENCOUNTER — TELEPHONE (OUTPATIENT)
Dept: INTERNAL MEDICINE CLINIC | Facility: CLINIC | Age: 42
End: 2022-05-04

## 2022-05-04 LAB
ABSOLUTE BASOPHILS: 0.1
ABSOLUTE EOSINOPHILS: 0.2
ABSOLUTE LYMPHOCYTES: 2.7
ABSOLUTE MONOCYTES: 0.4
ALBUMIN: 4.4 G/DL
ALKALINE PHOSPHATASE: 73
ALT (SGPT): 26 IU/L
AST (SGOT): 21 IU/L
BASOPHIL %: 0.8
BILIRUBIN, TOTAL: 0.6 MG/DL
BUN: 17
CALCIUM: 9.4
CHLORIDE: 105
CHOL/HDL RATIO: 3
CHOLESTEROL, TOTAL: 188 MG/DL
CO2: 25
CREATININE: 0.8 MG/DL
EGFR: >60
EOSINOPHIL %: 3.2
FREE T4: 1.5
GLUCOSE: 95
HDL CHOLESTEROL: 56 MG/DL
HEMATOCRIT: 41.8 %
HEMOGLOBIN: 14 G/DL (ref 12–15)
LDL CHOLESTEROL: 108 MG/DL (ref ?–130)
LYMPHOCYTE %: 40
MCH: 30.2 PG
MCHC: 33.6 G/DL
MCV: 90.1 FL
MEAN PLATELET VOLUME: 8 FL (ref 7–11.5)
MONOCYTE %: 7
NEUTROPHIL %: 49.4
NEUTROPHIL ABSOLUTE: 3.3
NRBC: <2
PLATELETS: 351
POTASSIUM: 4.6
RBC: 4.63
RDW: 13.6 %
SODIUM: 144
THYROXINE (T4): 16.5 ΜG/DL
TOTAL PROTEIN: 6.9
TRIGLYCERIDES: 117 MG/DL
TSH: 0.04 UIU/ML
VITAMIN D, 25-HYDROXY: 47.5 NG/ML
WBC: 6.6

## 2022-05-25 ENCOUNTER — TELEPHONE (OUTPATIENT)
Dept: INTERNAL MEDICINE CLINIC | Facility: CLINIC | Age: 42
End: 2022-05-25

## 2022-05-25 LAB
FREE T4: 1.2
THYROXINE (T4): 15.9 ΜG/DL
TSH: 0.15 UIU/ML

## 2022-05-28 ENCOUNTER — TELEPHONE (OUTPATIENT)
Dept: INTERNAL MEDICINE CLINIC | Facility: CLINIC | Age: 42
End: 2022-05-28

## 2022-05-28 DIAGNOSIS — E03.9 HYPOTHYROIDISM, UNSPECIFIED TYPE: Primary | ICD-10-CM

## 2022-05-28 NOTE — TELEPHONE ENCOUNTER
Lab results reviewed. TSH low and Thyroxine up. Lets have Pt decrease Levothyroxine to 125 mcg one daily. #30/1 RF. Recheck TSH in 6-8 weeks.

## 2022-05-31 RX ORDER — LEVOTHYROXINE SODIUM 0.12 MG/1
125 TABLET ORAL
Qty: 30 TABLET | Refills: 1 | Status: SHIPPED | OUTPATIENT
Start: 2022-05-31

## 2022-05-31 NOTE — TELEPHONE ENCOUNTER
Called pt. Went over TSH result. Pt aware of dosage change of levothyroxine, pharmacy verified, Rx sent. Pt aware to recheck TSH 6-8 wks.

## 2022-06-13 DIAGNOSIS — E78.5 HYPERLIPIDEMIA, UNSPECIFIED HYPERLIPIDEMIA TYPE: ICD-10-CM

## 2022-06-13 DIAGNOSIS — F41.9 ANXIETY: ICD-10-CM

## 2022-06-13 DIAGNOSIS — E03.9 HYPOTHYROIDISM, UNSPECIFIED TYPE: ICD-10-CM

## 2022-06-13 DIAGNOSIS — K21.9 GASTROESOPHAGEAL REFLUX DISEASE WITHOUT ESOPHAGITIS: ICD-10-CM

## 2022-06-13 RX ORDER — LEVOTHYROXINE SODIUM 0.12 MG/1
125 TABLET ORAL
Qty: 30 TABLET | Refills: 0 | Status: SHIPPED | OUTPATIENT
Start: 2022-06-13

## 2022-06-13 RX ORDER — ALPRAZOLAM 0.25 MG/1
TABLET ORAL
Qty: 30 TABLET | Refills: 0 | Status: SHIPPED | OUTPATIENT
Start: 2022-06-13

## 2022-06-13 RX ORDER — PANTOPRAZOLE SODIUM 40 MG/1
40 TABLET, DELAYED RELEASE ORAL 2 TIMES DAILY
Qty: 180 TABLET | Refills: 0 | Status: SHIPPED | OUTPATIENT
Start: 2022-06-13

## 2022-06-13 RX ORDER — ROSUVASTATIN CALCIUM 5 MG/1
5 TABLET, COATED ORAL DAILY
Qty: 90 TABLET | Refills: 0 | Status: SHIPPED | OUTPATIENT
Start: 2022-06-13

## 2022-07-13 DIAGNOSIS — E78.5 HYPERLIPIDEMIA, UNSPECIFIED HYPERLIPIDEMIA TYPE: ICD-10-CM

## 2022-07-13 RX ORDER — ROSUVASTATIN CALCIUM 5 MG/1
TABLET, COATED ORAL
Qty: 90 TABLET | Refills: 0 | Status: SHIPPED | OUTPATIENT
Start: 2022-07-13

## 2022-07-25 ENCOUNTER — PATIENT MESSAGE (OUTPATIENT)
Dept: INTERNAL MEDICINE CLINIC | Facility: CLINIC | Age: 42
End: 2022-07-25

## 2022-07-26 NOTE — TELEPHONE ENCOUNTER
Called [employer] [facility]. Unable to find patient with member ID. Called pharmacy, provided [employer] number  [phone]. Requested PA form to get faxed to our office.

## 2022-07-26 NOTE — TELEPHONE ENCOUNTER
Faxed completed PA form along with most recent progress notes back to [employer] at [phone]    Received confirmation. Copy sent to scan. Original form placed in SM accordion folder.     Awaiting on approval/Denial

## 2022-07-26 NOTE — TELEPHONE ENCOUNTER
From: Jamarcus Siu  To: THAIS Courtney  Sent: 7/25/2022 10:34 PM CDT  Subject: PA for Adderall     Hi,    My Adderall prior Luisito Tucker will be expiring soon. Can you submit for a new PA?     Thank you,  Pia

## 2022-08-01 NOTE — TELEPHONE ENCOUNTER
Adderall 20 MG approved   120 tablet per 30 days    Placed in accordion. Granted 08/14/2022- 08/14/2023    MCM sent to pt.

## 2022-08-08 ENCOUNTER — OFFICE VISIT (OUTPATIENT)
Dept: OBGYN CLINIC | Facility: CLINIC | Age: 42
End: 2022-08-08
Payer: COMMERCIAL

## 2022-08-08 VITALS
HEIGHT: 63 IN | HEART RATE: 107 BPM | DIASTOLIC BLOOD PRESSURE: 78 MMHG | WEIGHT: 201.5 LBS | SYSTOLIC BLOOD PRESSURE: 118 MMHG | BODY MASS INDEX: 35.7 KG/M2

## 2022-08-08 DIAGNOSIS — Z80.41 FAMILY HISTORY OF OVARIAN CANCER: ICD-10-CM

## 2022-08-08 DIAGNOSIS — Z12.4 CERVICAL CANCER SCREENING: ICD-10-CM

## 2022-08-08 DIAGNOSIS — Z12.31 ENCOUNTER FOR SCREENING MAMMOGRAM FOR BREAST CANCER: ICD-10-CM

## 2022-08-08 DIAGNOSIS — Z01.419 WELL WOMAN EXAM WITH ROUTINE GYNECOLOGICAL EXAM: Primary | ICD-10-CM

## 2022-08-08 RX ORDER — AMOXICILLIN 500 MG/1
TABLET, FILM COATED ORAL
COMMUNITY
Start: 2022-08-05

## 2022-08-16 NOTE — TELEPHONE ENCOUNTER
"PRIMARY DIAGNOSIS: \"GENERIC\" NURSING  OUTPATIENT/OBSERVATION GOALS TO BE MET BEFORE DISCHARGE:  1. ADLs back to baseline: No    2. Activity and level of assistance: Up with maximum assistance. Consider SW and/or PT evaluation.     3. Pain status: Pain free.    4. Return to near baseline physical activity: No     Discharge Planner Nurse   Safe discharge environment identified: No  Barriers to discharge: Yes       Entered by: Caitlin Flores RN 08/16/2022 6:27 AM       " Can give one month, Pt due for an appt

## 2022-08-22 LAB — HPV I/H RISK 1 DNA SPEC QL NAA+PROBE: NEGATIVE

## 2022-08-31 ENCOUNTER — TELEPHONE (OUTPATIENT)
Dept: INTERNAL MEDICINE CLINIC | Facility: CLINIC | Age: 42
End: 2022-08-31

## 2022-08-31 DIAGNOSIS — E03.9 HYPOTHYROIDISM, UNSPECIFIED TYPE: ICD-10-CM

## 2022-08-31 LAB
FREE T4: 1.1 NG/DL (ref 0.9–1.8)
THYROXINE (T4): 11.3 ΜG/DL
TSH: 0.24 MIU/L

## 2022-08-31 RX ORDER — LEVOTHYROXINE SODIUM 0.12 MG/1
TABLET ORAL
Qty: 90 TABLET | Refills: 0 | Status: SHIPPED | OUTPATIENT
Start: 2022-08-31

## 2022-08-31 RX ORDER — LEVOTHYROXINE SODIUM 0.12 MG/1
125 TABLET ORAL
Qty: 30 TABLET | Refills: 0 | Status: SHIPPED | OUTPATIENT
Start: 2022-08-31 | End: 2022-08-31

## 2022-09-01 RX ORDER — LEVOTHYROXINE SODIUM 112 UG/1
112 TABLET ORAL
Qty: 90 TABLET | Refills: 0 | Status: SHIPPED | OUTPATIENT
Start: 2022-09-01

## 2022-09-01 NOTE — TELEPHONE ENCOUNTER
Spoke with patient and relayed results per SM    Patient verbalized understanding     Called pharmacy to [facility] Levothyroxine 125 mcg and placed order for Levothyroxine 112 mcg

## 2022-09-01 NOTE — TELEPHONE ENCOUNTER
TSH too low. Pt needs to decrease the Levothyroxine dose to 112 mcg daily and recheck level in 6-8 weeks.

## 2022-09-12 ENCOUNTER — TELEPHONE (OUTPATIENT)
Dept: INTERNAL MEDICINE CLINIC | Facility: CLINIC | Age: 42
End: 2022-09-12

## 2022-09-12 RX ORDER — DEXTROAMPHETAMINE SACCHARATE, AMPHETAMINE ASPARTATE, DEXTROAMPHETAMINE SULFATE AND AMPHETAMINE SULFATE 5; 5; 5; 5 MG/1; MG/1; MG/1; MG/1
TABLET ORAL
Qty: 120 TABLET | Refills: 0 | Status: SHIPPED | OUTPATIENT
Start: 2022-11-12 | End: 2022-09-14

## 2022-09-12 RX ORDER — DEXTROAMPHETAMINE SACCHARATE, AMPHETAMINE ASPARTATE, DEXTROAMPHETAMINE SULFATE AND AMPHETAMINE SULFATE 5; 5; 5; 5 MG/1; MG/1; MG/1; MG/1
TABLET ORAL
Qty: 120 TABLET | Refills: 0 | Status: CANCELLED | OUTPATIENT
Start: 2022-09-12 | End: 2022-10-13

## 2022-09-12 RX ORDER — DEXTROAMPHETAMINE SACCHARATE, AMPHETAMINE ASPARTATE, DEXTROAMPHETAMINE SULFATE AND AMPHETAMINE SULFATE 5; 5; 5; 5 MG/1; MG/1; MG/1; MG/1
TABLET ORAL
Qty: 120 TABLET | Refills: 0 | Status: SHIPPED | OUTPATIENT
Start: 2022-10-12 | End: 2022-09-14

## 2022-09-12 RX ORDER — DEXTROAMPHETAMINE SACCHARATE, AMPHETAMINE ASPARTATE, DEXTROAMPHETAMINE SULFATE AND AMPHETAMINE SULFATE 5; 5; 5; 5 MG/1; MG/1; MG/1; MG/1
TABLET ORAL
Qty: 120 TABLET | Refills: 0 | Status: SHIPPED | OUTPATIENT
Start: 2022-09-12 | End: 2022-09-14

## 2022-09-12 NOTE — TELEPHONE ENCOUNTER
Pt is requesting 3 month supply of Adderall 20 mg    adderal 20 mg  Filled 8-13-22  Qty 120  0 refills  No upcoming appt.   LOV 2-28-22 SM

## 2022-09-13 NOTE — TELEPHONE ENCOUNTER
Received fax statin Adderall 20 mg is on back order. [facility] sent to pt stating to reach out to other pharmacies and see if they have it in stock or if needed be we can split doses up.      FYI

## 2022-09-14 RX ORDER — DEXTROAMPHETAMINE SACCHARATE, AMPHETAMINE ASPARTATE, DEXTROAMPHETAMINE SULFATE AND AMPHETAMINE SULFATE 5; 5; 5; 5 MG/1; MG/1; MG/1; MG/1
TABLET ORAL
Qty: 120 TABLET | Refills: 0 | Status: SHIPPED | OUTPATIENT
Start: 2022-11-14 | End: 2022-12-14

## 2022-09-14 RX ORDER — DEXTROAMPHETAMINE SACCHARATE, AMPHETAMINE ASPARTATE, DEXTROAMPHETAMINE SULFATE AND AMPHETAMINE SULFATE 5; 5; 5; 5 MG/1; MG/1; MG/1; MG/1
TABLET ORAL
Qty: 120 TABLET | Refills: 0 | Status: SHIPPED | OUTPATIENT
Start: 2022-10-14 | End: 2022-11-14

## 2022-09-14 RX ORDER — DEXTROAMPHETAMINE SACCHARATE, AMPHETAMINE ASPARTATE, DEXTROAMPHETAMINE SULFATE AND AMPHETAMINE SULFATE 5; 5; 5; 5 MG/1; MG/1; MG/1; MG/1
TABLET ORAL
Qty: 120 TABLET | Refills: 0 | Status: SHIPPED | OUTPATIENT
Start: 2022-09-14 | End: 2022-10-14

## 2022-09-14 NOTE — TELEPHONE ENCOUNTER
Spoke to pharmacy tech. They do have Adderall (generic) 20 mg in stock. Scripts pending please review scripts. Once scripts are sent, please route back to MA basket, need to let pt know meds.  sent

## 2022-10-13 NOTE — TELEPHONE ENCOUNTER
Levothyroxine 112 mcg  Too early for refill  Thyroid Supplements Protocol Failed 10/13/2022 04:29 PM   Protocol Details  TSH value between 0.350 and 5.500 IU/ml   Filled 9-1-22  Qty 90  0 refills    Do for repeat labs?     Labs done 8-30-22 Thyroid panel  Abnormal

## 2022-10-14 RX ORDER — LEVOTHYROXINE SODIUM 112 UG/1
112 TABLET ORAL
Qty: 90 TABLET | Refills: 0 | Status: SHIPPED | OUTPATIENT
Start: 2022-10-14

## 2023-01-10 DIAGNOSIS — E78.5 HYPERLIPIDEMIA, UNSPECIFIED HYPERLIPIDEMIA TYPE: ICD-10-CM

## 2023-01-10 DIAGNOSIS — F41.9 ANXIETY: ICD-10-CM

## 2023-01-10 RX ORDER — ALPRAZOLAM 0.25 MG/1
TABLET ORAL
Qty: 30 TABLET | Refills: 0 | Status: SHIPPED | OUTPATIENT
Start: 2023-01-10

## 2023-01-10 RX ORDER — ROSUVASTATIN CALCIUM 5 MG/1
TABLET, COATED ORAL
Qty: 90 TABLET | Refills: 0 | Status: SHIPPED | OUTPATIENT
Start: 2023-01-10

## 2023-01-10 NOTE — TELEPHONE ENCOUNTER
Rosuvastatin 5 mg  Filled 7-13-22  Qty 90  0 refills  No upcoming appt  LOV 2-28-22 SM  Labs: 4-29-22 Lipid

## 2023-01-10 NOTE — TELEPHONE ENCOUNTER
[facility] sent to pt stating due for CPX + Med f/u     Alprazolam 0.25 mg  Filled 6-13-22  Qty 30  0 refills  No upcoming appt.    LOV 2-28-22 SM

## 2023-01-11 ENCOUNTER — PATIENT MESSAGE (OUTPATIENT)
Dept: INTERNAL MEDICINE CLINIC | Facility: CLINIC | Age: 43
End: 2023-01-11

## 2023-01-11 RX ORDER — DEXTROAMPHETAMINE SACCHARATE, AMPHETAMINE ASPARTATE, DEXTROAMPHETAMINE SULFATE AND AMPHETAMINE SULFATE 5; 5; 5; 5 MG/1; MG/1; MG/1; MG/1
TABLET ORAL
Qty: 120 TABLET | Refills: 0 | Status: CANCELLED
Start: 2023-01-11 | End: 2023-02-10

## 2023-01-11 NOTE — TELEPHONE ENCOUNTER
I just sent it to [facility] in [facility] in [place] yesterday per her request. Now some where else? ??

## 2023-01-11 NOTE — TELEPHONE ENCOUNTER
From: Tobi Rincon  To: Sinclair Lesch, APRN  Sent: 1/11/2023 10:49 AM CST  Subject: Out of stock    Can you send the Adderall RX to [facility] [address] [facility] use is out of stock. Thank you!

## 2023-01-23 RX ORDER — LEVOTHYROXINE SODIUM 112 UG/1
TABLET ORAL
Qty: 90 TABLET | Refills: 0 | Status: SHIPPED | OUTPATIENT
Start: 2023-01-23

## 2023-01-30 DIAGNOSIS — G43.909 MIGRAINE WITHOUT STATUS MIGRAINOSUS, NOT INTRACTABLE, UNSPECIFIED MIGRAINE TYPE: ICD-10-CM

## 2023-01-30 RX ORDER — RIZATRIPTAN BENZOATE 10 MG/1
TABLET ORAL
Qty: 27 TABLET | Refills: 0 | Status: SHIPPED | OUTPATIENT
Start: 2023-01-30

## 2023-02-27 ENCOUNTER — TELEPHONE (OUTPATIENT)
Dept: INTERNAL MEDICINE CLINIC | Facility: CLINIC | Age: 43
End: 2023-02-27

## 2023-02-27 DIAGNOSIS — E03.9 HYPOTHYROIDISM, UNSPECIFIED TYPE: Primary | ICD-10-CM

## 2023-02-27 DIAGNOSIS — E03.9 HYPOTHYROIDISM, UNSPECIFIED TYPE: ICD-10-CM

## 2023-02-27 LAB
ABSOLUTE BASOPHILS: 0.1
ABSOLUTE EOSINOPHILS: 0.2
ABSOLUTE LYMPHOCYTES: 2.7
ABSOLUTE MONOCYTES: 0.5
ALBUMIN: 4.9 G/DL
ALKALINE PHOSPHATASE: 80
ALT: 22
AST: 18
BASOPHIL %: 1.2
BILIRUBIN, TOTAL: 0.6 MG/DL
BUN: 14 MG/DL (ref 7–22)
CALCIUM: 9.7 MG/DL
CHLORIDE: 100
CHOL/HDL RATIO: 3
CHOLESTEROL, TOTAL: 194 MG/DL
CO2: 31
CREATININE: 1 MG/DL
EOSINOPHIL %: 3.4
GLUCOSE: 85
HDL CHOLESTEROL: 67 MG/DL
HEMATOCRIT: 45
HEMOGLOBIN: 15
LDL CHOLESTEROL: 100 MG/DL (ref ?–130)
LYMPHOCYTE %: 40.7
MCH: 30 PG
MCHC: 33.2 G/DL
MCV: 90.3 FL
MONOCYTE %: 8
MPV: 9
NEUTROPHIL %: 47
NEUTROPHIL ABSOLUTE: 3.2
PLATELET COUNT: 312
POTASSIUM: 4.6
RBC: 4.99 X 10-6/UL
RDW: 13.5 %
SODIUM: 144
T4, FREE: 1.3
THYROXINE (T4): 17.4 ΜG/DL
TOTAL PROTEIN: 7.3
TRIGLYCERIDES: 139 MG/DL
TSH: 0.43 UIU/ML
VITAMIN D, 25-HYDROXY: 49.1 NG/ML
WBC: 6.7 X 10-3/UL

## 2023-02-27 RX ORDER — LEVOTHYROXINE SODIUM 0.1 MG/1
100 TABLET ORAL
Qty: 30 TABLET | Refills: 1 | Status: SHIPPED | OUTPATIENT
Start: 2023-02-27 | End: 2023-02-27

## 2023-02-27 RX ORDER — LEVOTHYROXINE SODIUM 0.1 MG/1
TABLET ORAL
Qty: 90 TABLET | Refills: 0 | Status: SHIPPED | OUTPATIENT
Start: 2023-02-27

## 2023-02-27 NOTE — TELEPHONE ENCOUNTER
Spoke to pt. Is aware of result. Ordered Levothyroxine. Pt states will get labs redrawn through work, no labs ordered by us.

## 2023-02-27 NOTE — TELEPHONE ENCOUNTER
Lab results reviewed. All labs look good except for the Thyroxine-T4. It is elevated. Discussed this with Dr. Leslie Escamilla he wants you to go down to Levothyroxine 100 mcg daily #30/1 RF. Recheck TSH and T4 in 6-8 weeks.

## 2023-03-09 DIAGNOSIS — K21.9 GASTROESOPHAGEAL REFLUX DISEASE WITHOUT ESOPHAGITIS: ICD-10-CM

## 2023-03-09 RX ORDER — PANTOPRAZOLE SODIUM 40 MG/1
TABLET, DELAYED RELEASE ORAL
Qty: 180 TABLET | Refills: 0 | OUTPATIENT
Start: 2023-03-09

## 2023-03-09 RX ORDER — PANTOPRAZOLE SODIUM 40 MG/1
40 TABLET, DELAYED RELEASE ORAL 2 TIMES DAILY
Qty: 60 TABLET | Refills: 0 | Status: SHIPPED | OUTPATIENT
Start: 2023-03-09 | End: 2023-03-09

## 2023-03-09 RX ORDER — DEXTROAMPHETAMINE SACCHARATE, AMPHETAMINE ASPARTATE, DEXTROAMPHETAMINE SULFATE AND AMPHETAMINE SULFATE 5; 5; 5; 5 MG/1; MG/1; MG/1; MG/1
TABLET ORAL
Qty: 120 TABLET | Refills: 0 | Status: SHIPPED | OUTPATIENT
Start: 2023-03-09 | End: 2023-04-08

## 2023-03-13 ENCOUNTER — OFFICE VISIT (OUTPATIENT)
Dept: INTERNAL MEDICINE CLINIC | Facility: CLINIC | Age: 43
End: 2023-03-13
Payer: COMMERCIAL

## 2023-03-13 VITALS
OXYGEN SATURATION: 97 % | HEIGHT: 63 IN | HEART RATE: 72 BPM | BODY MASS INDEX: 35.79 KG/M2 | WEIGHT: 202 LBS | TEMPERATURE: 98 F | RESPIRATION RATE: 16 BRPM | SYSTOLIC BLOOD PRESSURE: 116 MMHG | DIASTOLIC BLOOD PRESSURE: 74 MMHG

## 2023-03-13 DIAGNOSIS — F98.8 ATTENTION DEFICIT DISORDER (ADD) WITHOUT HYPERACTIVITY: ICD-10-CM

## 2023-03-13 DIAGNOSIS — G43.909 MIGRAINE WITHOUT STATUS MIGRAINOSUS, NOT INTRACTABLE, UNSPECIFIED MIGRAINE TYPE: ICD-10-CM

## 2023-03-13 DIAGNOSIS — K21.9 GASTROESOPHAGEAL REFLUX DISEASE WITHOUT ESOPHAGITIS: ICD-10-CM

## 2023-03-13 DIAGNOSIS — Z00.00 ROUTINE GENERAL MEDICAL EXAMINATION AT A HEALTH CARE FACILITY: Primary | ICD-10-CM

## 2023-03-13 DIAGNOSIS — E03.9 HYPOTHYROIDISM, UNSPECIFIED TYPE: ICD-10-CM

## 2023-03-13 DIAGNOSIS — E78.5 HYPERLIPIDEMIA, UNSPECIFIED HYPERLIPIDEMIA TYPE: ICD-10-CM

## 2023-03-27 ENCOUNTER — HOSPITAL ENCOUNTER (OUTPATIENT)
Dept: MAMMOGRAPHY | Age: 43
Discharge: HOME | End: 2023-03-27
Attending: NURSE PRACTITIONER
Payer: COMMERCIAL

## 2023-03-27 DIAGNOSIS — Z12.31 ENCOUNTER FOR SCREENING MAMMOGRAM FOR BREAST CANCER: ICD-10-CM

## 2023-03-27 PROCEDURE — 77067 SCR MAMMO BI INCL CAD: CPT | Performed by: NURSE PRACTITIONER

## 2023-03-27 PROCEDURE — 77063 BREAST TOMOSYNTHESIS BI: CPT | Performed by: NURSE PRACTITIONER

## 2023-04-11 DIAGNOSIS — E03.9 HYPOTHYROIDISM, UNSPECIFIED TYPE: ICD-10-CM

## 2023-04-11 RX ORDER — LEVOTHYROXINE SODIUM 0.1 MG/1
TABLET ORAL
Qty: 90 TABLET | Refills: 0 | OUTPATIENT
Start: 2023-04-11

## 2023-05-04 ENCOUNTER — TELEPHONE (OUTPATIENT)
Dept: INTERNAL MEDICINE CLINIC | Facility: CLINIC | Age: 43
End: 2023-05-04

## 2023-05-04 NOTE — TELEPHONE ENCOUNTER
Pt scheduled an appt for heart palpitations.  Please triage    Future Appointments   Date Time Provider Gal Merino   5/8/2023 11:45 AM THAIS Finley [facility] [place]

## 2023-05-04 NOTE — TELEPHONE ENCOUNTER
Future Appointments   Date Time Provider Gal Merino   5/8/2023 11:45 AM THAIS Holley [facility] [place]       Spoke with pt. Pt stated since around 4/7/23 pt has been experiencing intermittent heart palpitations. Pt describes them as \"skipping a beat or a flutter\". Pt states she notices it daily, but sometimes its just for a few seconds, other times it will last for minutes. Pt states in the past month she has not felt SOB, CP. No lightheadedness. Pt wonders if it is related to her thyroid. She states this is the lowest dosage she's been on for levothyroxine. Pt is having labs done at her job tomorrow. She was hoping to bring in the results on Monday to appt. SM: please advise if pt needs to be sooner or can wait till Monday. Pt is aware if s/s worsen to seek immediate care.

## 2023-05-04 NOTE — TELEPHONE ENCOUNTER
I agree with your recommendations. Ok to wait until Monday for an appt.  Can go to the ER if worsens

## 2023-05-08 ENCOUNTER — OFFICE VISIT (OUTPATIENT)
Dept: INTERNAL MEDICINE CLINIC | Facility: CLINIC | Age: 43
End: 2023-05-08
Payer: COMMERCIAL

## 2023-05-08 VITALS
SYSTOLIC BLOOD PRESSURE: 108 MMHG | OXYGEN SATURATION: 98 % | DIASTOLIC BLOOD PRESSURE: 78 MMHG | HEIGHT: 63 IN | TEMPERATURE: 99 F | BODY MASS INDEX: 35.86 KG/M2 | HEART RATE: 90 BPM | WEIGHT: 202.38 LBS

## 2023-05-08 DIAGNOSIS — E03.9 HYPOTHYROIDISM, UNSPECIFIED TYPE: ICD-10-CM

## 2023-05-08 DIAGNOSIS — R00.2 HEART PALPITATIONS: Primary | ICD-10-CM

## 2023-05-08 PROCEDURE — 3078F DIAST BP <80 MM HG: CPT | Performed by: FAMILY MEDICINE

## 2023-05-08 PROCEDURE — 99213 OFFICE O/P EST LOW 20 MIN: CPT | Performed by: FAMILY MEDICINE

## 2023-05-08 PROCEDURE — 3074F SYST BP LT 130 MM HG: CPT | Performed by: FAMILY MEDICINE

## 2023-05-08 PROCEDURE — 3008F BODY MASS INDEX DOCD: CPT | Performed by: FAMILY MEDICINE

## 2023-05-08 RX ORDER — LEVOTHYROXINE SODIUM 112 UG/1
TABLET ORAL
Qty: 90 TABLET | Refills: 0 | OUTPATIENT
Start: 2023-05-08

## 2023-05-08 RX ORDER — LEVOTHYROXINE SODIUM 112 UG/1
112 TABLET ORAL
Qty: 30 TABLET | Refills: 1 | Status: SHIPPED | OUTPATIENT
Start: 2023-05-08

## 2023-05-10 ENCOUNTER — TELEPHONE (OUTPATIENT)
Dept: INTERNAL MEDICINE CLINIC | Facility: CLINIC | Age: 43
End: 2023-05-10

## 2023-05-10 LAB
ALBUMIN: 4.6 G/DL
ALKALINE PHOSPHATASE: 87
ALT: 18
AST: 17
BASO(ABSOLUTE): 0.1
BASOPHILS, %: 0.9
BILIRUBIN, TOTAL: 0.6 MG/DL
BUN: 20 MG/DL (ref 7–22)
CALCIUM: 10.1
CARBON DIOXIDE: 28
CHLORIDE: 103
CHOL/HDL RATIO: 3
CHOLESTEROL: 185
CREATININE: 1 MG/DL
EGFR: >60
EOS (ABSOLUTE): 0.2 X10E3/UL
EOSINOPHIL %: 3.3
FREE T4: 1.3
GLUCOSE: 84
HDL CHOLESTEROL: 69 MG/DL
HEMATOCRIT: 45.1 %
HEMOGLOBIN: 14.6 G/DL (ref 12–16)
LDL CHOLESTEROL: 102 MG/DL (ref ?–130)
LYMPHOCYTE %: 46.3
LYMPHOCYTE ABSOLUTE: 3.4
MCH: 29.5 PG
MCHC: 32.5 G/DL
MCV: 90.8 FL
MONOCYTE %: 7
MONOCYTES(ABSOLUTE): 0.5
MPV: 8
NEUTROPHIL ABSOLUTE: 3.1
NEUTROPHILS: 42.1 %
NRBC: <2
PLATELETS: 372
POTASSIUM: 4.8
RBC: 4.96
RDW: 14.2 %
SODIUM: 141
THYROXINE (T4): 15.3 ΜG/DL
TOTAL PROTEIN: 7.1
TRIGLYCERIDES: 74
TSH: 4.2 UIU/ML
VITAMIN D, 25-HYDROXY: 68.9 NG/ML
WBC: 7.3 X 10-3/UL

## 2023-05-10 NOTE — TELEPHONE ENCOUNTER
Incoming fax from [facility]    Patients Labs from 5/5/2023    Entered in Scott and sent to scan--SM already reviewed

## 2023-06-07 DIAGNOSIS — F41.9 ANXIETY: ICD-10-CM

## 2023-06-07 RX ORDER — ALPRAZOLAM 0.25 MG/1
TABLET ORAL
Qty: 30 TABLET | Refills: 0 | Status: SHIPPED | OUTPATIENT
Start: 2023-06-07

## 2023-06-28 DIAGNOSIS — E78.5 HYPERLIPIDEMIA, UNSPECIFIED HYPERLIPIDEMIA TYPE: ICD-10-CM

## 2023-06-29 RX ORDER — ROSUVASTATIN CALCIUM 5 MG/1
5 TABLET, COATED ORAL DAILY
Qty: 90 TABLET | Refills: 2 | Status: SHIPPED | OUTPATIENT
Start: 2023-06-29

## 2023-07-09 DIAGNOSIS — E03.9 HYPOTHYROIDISM, UNSPECIFIED TYPE: ICD-10-CM

## 2023-07-10 RX ORDER — LEVOTHYROXINE SODIUM 112 UG/1
TABLET ORAL
Qty: 30 TABLET | Refills: 2 | Status: SHIPPED | OUTPATIENT
Start: 2023-07-10

## 2023-07-10 NOTE — TELEPHONE ENCOUNTER
Levothyroxine 112 mcg  Filled 5-8-23  Qty 30  1 refill  No upcoming appt  LOV 5-8-23 SM  Labs 5-5-23 Thyroid panel

## 2023-08-12 ENCOUNTER — PATIENT MESSAGE (OUTPATIENT)
Dept: INTERNAL MEDICINE CLINIC | Facility: CLINIC | Age: 43
End: 2023-08-12

## 2023-08-14 RX ORDER — DEXTROAMPHETAMINE SACCHARATE, AMPHETAMINE ASPARTATE, DEXTROAMPHETAMINE SULFATE AND AMPHETAMINE SULFATE 5; 5; 5; 5 MG/1; MG/1; MG/1; MG/1
40 TABLET ORAL 2 TIMES DAILY
Qty: 120 TABLET | Refills: 0 | Status: SHIPPED | OUTPATIENT
Start: 2023-08-14

## 2023-08-14 RX ORDER — DEXTROAMPHETAMINE SACCHARATE, AMPHETAMINE ASPARTATE, DEXTROAMPHETAMINE SULFATE AND AMPHETAMINE SULFATE 5; 5; 5; 5 MG/1; MG/1; MG/1; MG/1
40 TABLET ORAL 2 TIMES DAILY
Qty: 120 TABLET | Refills: 0 | Status: SHIPPED | OUTPATIENT
Start: 2023-09-14 | End: 2023-10-14

## 2023-08-14 RX ORDER — DEXTROAMPHETAMINE SACCHARATE, AMPHETAMINE ASPARTATE, DEXTROAMPHETAMINE SULFATE AND AMPHETAMINE SULFATE 5; 5; 5; 5 MG/1; MG/1; MG/1; MG/1
40 TABLET ORAL 2 TIMES DAILY
Qty: 120 TABLET | Refills: 0 | Status: SHIPPED | OUTPATIENT
Start: 2023-10-15 | End: 2023-11-14

## 2023-08-14 NOTE — TELEPHONE ENCOUNTER
LOV:   2023 Larry PLASCENCIA  No FOV scheduled     Last refill:  amphetamine-dextroamphetamine (ADDERALL) 20 MG Oral Tab () 120 tablet 0 7/10/2023 2023   Sig:   Take 2 tablets twice per day

## 2023-09-14 ENCOUNTER — TELEPHONE (OUTPATIENT)
Dept: INTERNAL MEDICINE CLINIC | Facility: CLINIC | Age: 43
End: 2023-09-14

## 2023-09-14 DIAGNOSIS — E03.9 HYPOTHYROIDISM, UNSPECIFIED TYPE: Primary | ICD-10-CM

## 2023-09-14 LAB
ABSOLUTE BASOPHILS: 0.1
ABSOLUTE EOSINOPHILS: 0.3
ABSOLUTE LYMPHOCYTES: 2.8
ABSOLUTE MONOCYTES: 0.4
ALBUMIN: 4.8 G/DL
ALKALINE PHOSPHATASE: 88
ALT: 20
AST: 22
BASOPHIL %: 1.2
BILIRUBIN, TOTAL: 0.7 MG/DL
BUN: 18
CALCIUM: 9.5 MG/DL
CHLORIDE: 105
CHOLESTEROL, TOTAL: 200 MG/DL
CO2: 25
CREATININE: 0.8 MG/DL
EOSINOPHIL %: 4.6
GLUCOSE: 95
HDL CHOLESTEROL: 67 MG/DL
HEMATOCRIT: 43.2 %
HEMOGLOBIN: 14.3
LDL CHOLESTEROL: 104 MG/DL (ref ?–130)
LYMPHOCYTE %: 46.1
MCH: 29.9 PG
MCHC: 33 G/DL
MCV: 90.6 FL
MONOCYTE %: 6
MPV: 9
NEUTROPHIL %: 41.8
NEUTROPHIL ABSOLUTE: 2.5
PLATELET COUNT: 287
POTASSIUM: 4.1
RBC: 4.77 X 10-6/UL
RDW: 14.2 %
SODIUM: 141
T4, FREE: 1.2
THYROXINE (T4): 10.2 ΜG/DL
TOTAL PROTEIN: 7.3
TRIGLYCERIDES: 143 MG/DL
TSH: 6.62 UIU/ML
VITAMIN D, 25-HYDROXY: 44 NG/ML
WBC: 6 X 10-3/UL

## 2023-09-15 ENCOUNTER — PATIENT MESSAGE (OUTPATIENT)
Dept: INTERNAL MEDICINE CLINIC | Facility: CLINIC | Age: 43
End: 2023-09-15

## 2023-09-15 RX ORDER — DEXTROAMPHETAMINE SACCHARATE, AMPHETAMINE ASPARTATE, DEXTROAMPHETAMINE SULFATE AND AMPHETAMINE SULFATE 5; 5; 5; 5 MG/1; MG/1; MG/1; MG/1
40 TABLET ORAL 2 TIMES DAILY
Qty: 120 TABLET | Refills: 0 | Status: SHIPPED | OUTPATIENT
Start: 2023-09-15 | End: 2023-10-15

## 2023-09-18 DIAGNOSIS — E03.9 HYPOTHYROIDISM, UNSPECIFIED TYPE: ICD-10-CM

## 2023-09-18 RX ORDER — LEVOTHYROXINE SODIUM 112 UG/1
112 TABLET ORAL
Qty: 30 TABLET | Refills: 1 | Status: SHIPPED | OUTPATIENT
Start: 2023-09-18

## 2023-09-18 RX ORDER — LEVOTHYROXINE SODIUM 112 UG/1
112 TABLET ORAL
Qty: 90 TABLET | Refills: 0 | OUTPATIENT
Start: 2023-09-18

## 2023-11-14 DIAGNOSIS — G43.909 MIGRAINE WITHOUT STATUS MIGRAINOSUS, NOT INTRACTABLE, UNSPECIFIED MIGRAINE TYPE: ICD-10-CM

## 2023-11-14 RX ORDER — RIZATRIPTAN BENZOATE 10 MG/1
TABLET ORAL
Qty: 27 TABLET | Refills: 0 | Status: SHIPPED | OUTPATIENT
Start: 2023-11-14

## 2023-11-14 RX ORDER — DEXTROAMPHETAMINE SACCHARATE, AMPHETAMINE ASPARTATE, DEXTROAMPHETAMINE SULFATE AND AMPHETAMINE SULFATE 5; 5; 5; 5 MG/1; MG/1; MG/1; MG/1
40 TABLET ORAL 2 TIMES DAILY
Qty: 120 TABLET | Refills: 0 | Status: SHIPPED | OUTPATIENT
Start: 2023-11-14

## 2023-11-14 NOTE — TELEPHONE ENCOUNTER
Requesting 3 months worth    Adderall 20 mg  Filled 8-14-23  Qty 120  0 refills  No future appointments.   LOV 5-8-23 SM

## 2023-12-07 ENCOUNTER — TELEPHONE (OUTPATIENT)
Dept: INTERNAL MEDICINE CLINIC | Facility: CLINIC | Age: 43
End: 2023-12-07

## 2023-12-07 LAB
ABSOLUTE BASOPHILS: 0.1
ABSOLUTE EOSINOPHILS: 0.2
ABSOLUTE LYMPHOCYTES: 3.1
ABSOLUTE MONOCYTES: 0.4
ALBUMIN: 4.1 G/DL
ALKALINE PHOSPHATASE: 64
ALT: 17
AST: 15
BASOPHIL %: 1.3
BILIRUBIN, TOTAL: 0.5 MG/DL
BUN: 18 MG/DL (ref 7–22)
CALCIUM: 9.6 MG/DL
CHLORIDE: 106
CHOLESTEROL, TOTAL: 187 MG/DL
CO2: 27
CREATININE: 0.8 MG/DL
EGFR: <60
EOSINOPHIL %: 3.5
FREE T4: 1
GLUCOSE: 88
HDL CHOLESTEROL: 62 MG/DL
HEMATOCRIT: 42.6 %
HEMOGLOBIN: 13.9
LDL CHOLESTEROL: 105 MG/DL (ref ?–130)
LYMPHOCYTE %: 46.9
MCH: 29.6 PG
MCHC: 32.6 G/DL
MCV: 90.9 FL
MEAN PLATELET VOLUME: 9 FL (ref 7–11.5)
MONOCYTE %: 6
NEUTROPHIL %: 42.6
NEUTROPHIL ABSOLUTE: 2.8
PLATELET COUNT: 311
POTASSIUM: 4.6
RBC: 4.68 X 10-6/UL
RDW: 13.6 %
SODIUM: 143
THYROXINE (T4): 10.2 ΜG/DL
TOTAL PROTEIN: 6.3
TRIGLYCERIDES: 100 MG/DL
TSH: 1.86 UIU/ML
VITAMIN D, 25-HYDROXY: 46 NG/ML
WBC: 6.6 X 10-3/UL

## 2023-12-07 NOTE — TELEPHONE ENCOUNTER
Lab results reviewed. Pt has mild anemia which she should take an iron supplement for daily ( like Slow Fe) other wise the labs look good.

## 2023-12-11 ENCOUNTER — OFFICE VISIT (OUTPATIENT)
Dept: INTERNAL MEDICINE CLINIC | Facility: CLINIC | Age: 43
End: 2023-12-11
Payer: COMMERCIAL

## 2023-12-11 VITALS
DIASTOLIC BLOOD PRESSURE: 78 MMHG | RESPIRATION RATE: 16 BRPM | WEIGHT: 198 LBS | HEART RATE: 88 BPM | OXYGEN SATURATION: 98 % | HEIGHT: 63 IN | SYSTOLIC BLOOD PRESSURE: 128 MMHG | BODY MASS INDEX: 35.08 KG/M2

## 2023-12-11 DIAGNOSIS — K21.9 GASTROESOPHAGEAL REFLUX DISEASE WITHOUT ESOPHAGITIS: Primary | ICD-10-CM

## 2023-12-11 DIAGNOSIS — F41.9 ANXIETY: ICD-10-CM

## 2023-12-11 DIAGNOSIS — F98.8 ATTENTION DEFICIT DISORDER (ADD) WITHOUT HYPERACTIVITY: ICD-10-CM

## 2023-12-11 DIAGNOSIS — E03.9 HYPOTHYROIDISM, UNSPECIFIED TYPE: ICD-10-CM

## 2023-12-11 DIAGNOSIS — Z23 NEED FOR TDAP VACCINATION: ICD-10-CM

## 2023-12-11 DIAGNOSIS — E78.5 HYPERLIPIDEMIA, UNSPECIFIED HYPERLIPIDEMIA TYPE: ICD-10-CM

## 2023-12-11 DIAGNOSIS — G43.909 MIGRAINE WITHOUT STATUS MIGRAINOSUS, NOT INTRACTABLE, UNSPECIFIED MIGRAINE TYPE: ICD-10-CM

## 2023-12-11 PROCEDURE — 3008F BODY MASS INDEX DOCD: CPT | Performed by: FAMILY MEDICINE

## 2023-12-11 PROCEDURE — 3078F DIAST BP <80 MM HG: CPT | Performed by: FAMILY MEDICINE

## 2023-12-11 PROCEDURE — 99214 OFFICE O/P EST MOD 30 MIN: CPT | Performed by: FAMILY MEDICINE

## 2023-12-11 PROCEDURE — 3074F SYST BP LT 130 MM HG: CPT | Performed by: FAMILY MEDICINE

## 2023-12-11 PROCEDURE — 90471 IMMUNIZATION ADMIN: CPT | Performed by: FAMILY MEDICINE

## 2023-12-11 PROCEDURE — 90715 TDAP VACCINE 7 YRS/> IM: CPT | Performed by: FAMILY MEDICINE

## 2023-12-11 RX ORDER — ROSUVASTATIN CALCIUM 5 MG/1
5 TABLET, COATED ORAL DAILY
Qty: 90 TABLET | Refills: 1 | Status: SHIPPED | OUTPATIENT
Start: 2023-12-11

## 2023-12-11 RX ORDER — DEXTROAMPHETAMINE SACCHARATE, AMPHETAMINE ASPARTATE, DEXTROAMPHETAMINE SULFATE AND AMPHETAMINE SULFATE 5; 5; 5; 5 MG/1; MG/1; MG/1; MG/1
40 TABLET ORAL 2 TIMES DAILY
Qty: 120 TABLET | Refills: 0 | Status: CANCELLED | OUTPATIENT
Start: 2023-12-11

## 2023-12-11 RX ORDER — DEXTROAMPHETAMINE SACCHARATE, AMPHETAMINE ASPARTATE, DEXTROAMPHETAMINE SULFATE AND AMPHETAMINE SULFATE 5; 5; 5; 5 MG/1; MG/1; MG/1; MG/1
TABLET ORAL
Qty: 120 TABLET | Refills: 0 | Status: SHIPPED | OUTPATIENT
Start: 2024-02-12 | End: 2024-03-13

## 2023-12-11 RX ORDER — DEXTROAMPHETAMINE SACCHARATE, AMPHETAMINE ASPARTATE, DEXTROAMPHETAMINE SULFATE AND AMPHETAMINE SULFATE 5; 5; 5; 5 MG/1; MG/1; MG/1; MG/1
TABLET ORAL
Qty: 120 TABLET | Refills: 0 | Status: SHIPPED | OUTPATIENT
Start: 2023-12-11 | End: 2024-01-11

## 2023-12-11 RX ORDER — LEVOTHYROXINE SODIUM 112 UG/1
112 TABLET ORAL
Qty: 90 TABLET | Refills: 1 | Status: SHIPPED | OUTPATIENT
Start: 2023-12-11

## 2023-12-11 RX ORDER — PANTOPRAZOLE SODIUM 40 MG/1
40 TABLET, DELAYED RELEASE ORAL 2 TIMES DAILY
Qty: 180 TABLET | Refills: 0 | Status: SHIPPED | OUTPATIENT
Start: 2023-12-11

## 2023-12-11 RX ORDER — DEXTROAMPHETAMINE SACCHARATE, AMPHETAMINE ASPARTATE, DEXTROAMPHETAMINE SULFATE AND AMPHETAMINE SULFATE 5; 5; 5; 5 MG/1; MG/1; MG/1; MG/1
TABLET ORAL
Qty: 120 TABLET | Refills: 0 | Status: SHIPPED | OUTPATIENT
Start: 2024-01-12 | End: 2024-02-11

## 2024-01-09 ENCOUNTER — PATIENT MESSAGE (OUTPATIENT)
Dept: INTERNAL MEDICINE CLINIC | Facility: CLINIC | Age: 44
End: 2024-01-09

## 2024-01-09 DIAGNOSIS — F98.8 ATTENTION DEFICIT DISORDER (ADD) WITHOUT HYPERACTIVITY: ICD-10-CM

## 2024-01-10 RX ORDER — DEXTROAMPHETAMINE SACCHARATE, AMPHETAMINE ASPARTATE, DEXTROAMPHETAMINE SULFATE AND AMPHETAMINE SULFATE 5; 5; 5; 5 MG/1; MG/1; MG/1; MG/1
TABLET ORAL
Qty: 120 TABLET | Refills: 0 | Status: SHIPPED | OUTPATIENT
Start: 2024-02-12 | End: 2024-03-13

## 2024-01-10 RX ORDER — DEXTROAMPHETAMINE SACCHARATE, AMPHETAMINE ASPARTATE, DEXTROAMPHETAMINE SULFATE AND AMPHETAMINE SULFATE 5; 5; 5; 5 MG/1; MG/1; MG/1; MG/1
TABLET ORAL
Qty: 120 TABLET | Refills: 0 | Status: SHIPPED | OUTPATIENT
Start: 2024-01-12 | End: 2024-02-11

## 2024-01-10 NOTE — TELEPHONE ENCOUNTER
From: Pia Evans  To: Gia Bowman  Sent: 1/9/2024 9:10 PM CST  Subject: RX dates    Hi,  [facility] RX[facility] for the generic Adderall. The ones they have are dated for 1/12 and 2/12. Which would be 32 days between RXs. I last filled it on 12/11. I took my last ones today and would be without with having to wait until the 12th.     Thank you,  Pia

## 2024-02-09 DIAGNOSIS — F98.8 ATTENTION DEFICIT DISORDER (ADD) WITHOUT HYPERACTIVITY: ICD-10-CM

## 2024-02-09 RX ORDER — DEXTROAMPHETAMINE SACCHARATE, AMPHETAMINE ASPARTATE, DEXTROAMPHETAMINE SULFATE AND AMPHETAMINE SULFATE 5; 5; 5; 5 MG/1; MG/1; MG/1; MG/1
TABLET ORAL
Qty: 120 TABLET | Refills: 0 | Status: SHIPPED | OUTPATIENT
Start: 2024-04-11 | End: 2024-05-11

## 2024-02-09 RX ORDER — DEXTROAMPHETAMINE SACCHARATE, AMPHETAMINE ASPARTATE, DEXTROAMPHETAMINE SULFATE AND AMPHETAMINE SULFATE 5; 5; 5; 5 MG/1; MG/1; MG/1; MG/1
TABLET ORAL
Qty: 120 TABLET | Refills: 0 | Status: CANCELLED | OUTPATIENT
Start: 2024-02-09 | End: 2024-03-10

## 2024-02-09 RX ORDER — DEXTROAMPHETAMINE SACCHARATE, AMPHETAMINE ASPARTATE, DEXTROAMPHETAMINE SULFATE AND AMPHETAMINE SULFATE 5; 5; 5; 5 MG/1; MG/1; MG/1; MG/1
TABLET ORAL
Qty: 120 TABLET | Refills: 0 | Status: SHIPPED | OUTPATIENT
Start: 2024-02-09 | End: 2024-03-10

## 2024-02-09 RX ORDER — DEXTROAMPHETAMINE SACCHARATE, AMPHETAMINE ASPARTATE, DEXTROAMPHETAMINE SULFATE AND AMPHETAMINE SULFATE 5; 5; 5; 5 MG/1; MG/1; MG/1; MG/1
TABLET ORAL
Qty: 120 TABLET | Refills: 0 | Status: SHIPPED | OUTPATIENT
Start: 2024-03-11 | End: 2024-04-10

## 2024-02-09 NOTE — TELEPHONE ENCOUNTER
Pt is requesting 3 mo. Supply to be sent in.  MCM sent to pt to notify there is a refill at the pharmacy.  Needs additional refills    Adderall 20 mg  Filled 2-12-24  Qty 120  0 refills  No future appointments.  LOV 12-11-23

## 2024-02-12 ENCOUNTER — TELEPHONE (OUTPATIENT)
Dept: INTERNAL MEDICINE CLINIC | Facility: CLINIC | Age: 44
End: 2024-02-12

## 2024-03-06 ENCOUNTER — PATIENT MESSAGE (OUTPATIENT)
Dept: INTERNAL MEDICINE CLINIC | Facility: CLINIC | Age: 44
End: 2024-03-06

## 2024-03-06 NOTE — TELEPHONE ENCOUNTER
Spoke to Sofia, does states quantity limit 3 tabs per day.   Need to go onto [employer], click on PA.     If anything is needed to be faxed over, fax # is [phone].    PA submitted via website.   PA ID# [id number]    Chart notes from LOV faxed over.

## 2024-03-06 NOTE — TELEPHONE ENCOUNTER
From: Pia Evans  To: Gia Bowman  Sent: 3/6/2024 6:58 AM CST  Subject: PA needed    Good morning,    As of the new year I got new insurance and looks like they are also requiring a Prior Auth for my Adderall. Can you submit for the auth? [facility] stated they sent you the info a couple weeks ago.    Thank you!   Pia

## 2024-03-12 ENCOUNTER — OFFICE VISIT (OUTPATIENT)
Dept: INTERNAL MEDICINE CLINIC | Facility: CLINIC | Age: 44
End: 2024-03-12
Payer: COMMERCIAL

## 2024-03-12 VITALS
RESPIRATION RATE: 16 BRPM | TEMPERATURE: 97 F | HEART RATE: 78 BPM | BODY MASS INDEX: 35.93 KG/M2 | WEIGHT: 202.81 LBS | DIASTOLIC BLOOD PRESSURE: 78 MMHG | OXYGEN SATURATION: 98 % | HEIGHT: 63 IN | SYSTOLIC BLOOD PRESSURE: 126 MMHG

## 2024-03-12 DIAGNOSIS — M25.532 BILATERAL WRIST PAIN: Primary | ICD-10-CM

## 2024-03-12 DIAGNOSIS — K59.00 CONSTIPATION, UNSPECIFIED CONSTIPATION TYPE: ICD-10-CM

## 2024-03-12 DIAGNOSIS — M25.531 BILATERAL WRIST PAIN: Primary | ICD-10-CM

## 2024-03-12 PROCEDURE — 99214 OFFICE O/P EST MOD 30 MIN: CPT | Performed by: FAMILY MEDICINE

## 2024-03-12 NOTE — PROGRESS NOTES
CHIEF COMPLAINT:     Chief Complaint   Patient presents with    Bloating     Last few months, feeling full and bloated.     Numbness     On going issue, both hands, getting worse.        HPI:   Pia Evans is a 43 year old female .  Pt presents with complaints of constipation. Pt has had for years but lately has noticed more bloating and feeling full. BM's are described as occ being hard but can be soft when they occur. Pt denies blood  in the stool, or cramping.Pt admits to not eating much fiber in the diet but did recently start taking fiber gummies. Pt has used laxatives from time to time and it can help but can still take a few days to kick in. Pt states she does not get gas. Pt states she may not have a BM but once every 2-4 weeks,sometimes longer. Pt has never seen GI for this.  The patient has complaints of bilateral wrist pain. Pain started several months ago. Pt thought it would get better with the job change but it has not. Pt  a room in her house a few weeks ago and it took weeks for her wrists not to be numb and painful. 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 Pt reports tingling/numbness in the hand. Pt reports some weakness in the hand. Pain is worsened by over use. Pain is lessened by rest, ibuprofen and wrist supports/braces.      Current Outpatient Medications   Medication Sig Dispense Refill    amphetamine-dextroamphetamine (ADDERALL) 20 MG Oral Tab Take 2 tabs (40 mg) twice a day 120 tablet 0    pantoprazole 40 MG Oral Tab EC Take 1 tablet (40 mg total) by mouth 2 (two) times daily. 180 tablet 0    levothyroxine 112 MCG Oral Tab Take 1 tablet (112 mcg total) by mouth before breakfast. 90 tablet 1    rosuvastatin 5 MG Oral Tab Take 1 tablet (5 mg total) by mouth daily. 90 tablet 1    Rizatriptan Benzoate (MAXALT) 10 MG Oral Tab Take one at the onset of your headache and repeat in 2 hours if needed. 27 tablet 0    amphetamine-dextroamphetamine (ADDERALL) 20 MG Oral Tab        ALPRAZOLAM 0.25 MG Oral Tab TAKE 1 TABLET BY MOUTH TWICE DAILY AS NEEDED 30 tablet 0    valACYclovir HCl 500 MG Oral Tab as needed.        levonorgestrel 20 MCG/24HR Intrauterine IUD 20 mcg (1 each total) by Intrauterine route once.      amphetamine-dextroamphetamine (ADDERALL) 20 MG Oral Tab Take two tablets (40 mg) twice a day (Patient not taking: Reported on 3/12/2024) 120 tablet 0    [START ON 4/11/2024] amphetamine-dextroamphetamine (ADDERALL) 20 MG Oral Tab Take two tablets (40 mg) twice a day (Patient not taking: Reported on 3/12/2024) 120 tablet 0      Past Medical History:   Diagnosis Date    Cervical polyp 05/18/2016    Encounter for insertion of mirena IUD 01/28/2013    Encounter for screening for human papillomavirus (HPV) 05/16/2016    NEGATIVE    General counseling for prescription of oral contraceptives     Normal delivery (HCC)     x3    Pap smear for cervical cancer screening 5/16/16, 9/18/12, 5/18/09, 11/5/07, 2/23/07, 12/21/05, 3/10/04    WNL    Thyroid disease       Social History:  Social History     Socioeconomic History    Marital status:    Tobacco Use    Smoking status: Never    Smokeless tobacco: Never   Vaping Use    Vaping Use: Never used   Substance and Sexual Activity    Alcohol use: Yes     Comment: occ    Drug use: No    Sexual activity: Yes     Partners: Male     Birth control/protection: I.U.D., Mirena   Other Topics Concern    Caffeine Concern Yes     Comment: 1 cup coffee in the am    Exercise Yes     Comment: 5x/week    Seat Belt Yes        REVIEW OF SYSTEMS:   GENERAL: Denies fever, chills,weight change, decreased appetite  SKIN: Denies rashes, skin wounds or ulcers.  EYES: Denies blurred vision or double vision  HENT: Denies congestion, rhinorrhea, sore throat or ear pain  CHEST: Denies chest pain, or palpitations  LUNGS: Denies shortness of breath, cough, or wheezing  GI: see HPI  MUSCULOSKELETAL: see HPI  LYMPH:  Denies lymphadenopathy  NEURO: Denies headaches or  lightheadedness      EXAM:   /78 (BP Location: Left arm, Patient Position: Sitting, Cuff Size: adult)   Pulse 78   Temp 97.4 °F (36.3 °C) (Temporal)   Resp 16   Ht 5' 3\" (1.6 m)   Wt 202 lb 12.8 oz (92 kg)   LMP  (LMP Unknown)   SpO2 98%   BMI 35.92 kg/m²   GENERAL: well developed, well nourished,in no apparent distress  HEAD: atraumatic, normocephalic  EYES: conjunctiva clear  NECK: supple, non-tender  LUNGS: clear to auscultation bilaterally, no wheezes or rhonchi. Breathing is non labored.  CARDIO: RRR without murmur  GI: No visible scars, or masses. BS's present x4. No palpable masses or hepatosplenomegaly.  no tenderness on palpation in all quads.  EXTREMITIES: no edema  WRIST/Bilaterally:   Edema none  Bruising: none  Skin intact:  +  Normal sensation: +  Normal capillary refill: +  ROM:  Wrist NL , right /left pain with supination and pronation , but normal range of motion. Normal flexion, extension  Point Tenderness: wrist to the elbow. R>L   Radial pulses:  +2 and strong.  Skin is warm and dry      Physical Exam    ASSESSMENT AND PLAN:     Encounter Diagnoses   Name Primary?    Bilateral wrist pain Yes    Constipation, unspecified constipation type        No orders of the defined types were placed in this encounter.      Meds & Refills for this Visit:  Requested Prescriptions      No prescriptions requested or ordered in this encounter       Imaging & Consults:  ORTHOPEDIC - INTERNAL  GASTRO - INTERNAL    PLAN:  1. Bilateral wrist pain  - rest, ice, elevate, continue wrist splints, Ibuprofen   - Ortho Referral - In Network    2. Constipation, unspecified constipation type  Pt has a hx of constipation. Pt is instructed to increase fiber in the diet by eating a bran cereal in the morning and more fruits and vegetables during the day. Pt also instructed to continue taking her fiber supplement daily and drink plenty of water.If persists, Pt should see GI for a consult.  - Gastro Referral - In  Network    The patient indicates understanding of these issues and agrees to the plan.  The patient is asked to call with any concerns.      I spent 30 minutes preparing to see the patient,reviewing patient's chart,results,history,medical decision making,placing orders,counseling/coordinating care and documenting in the chart.

## 2024-05-08 DIAGNOSIS — F98.8 ATTENTION DEFICIT DISORDER (ADD) WITHOUT HYPERACTIVITY: ICD-10-CM

## 2024-05-08 DIAGNOSIS — F41.9 ANXIETY: ICD-10-CM

## 2024-05-08 RX ORDER — DEXTROAMPHETAMINE SACCHARATE, AMPHETAMINE ASPARTATE, DEXTROAMPHETAMINE SULFATE AND AMPHETAMINE SULFATE 5; 5; 5; 5 MG/1; MG/1; MG/1; MG/1
TABLET ORAL
Qty: 120 TABLET | Refills: 0 | Status: CANCELLED | OUTPATIENT
Start: 2024-05-08 | End: 2024-06-07

## 2024-05-09 RX ORDER — ALPRAZOLAM 0.25 MG/1
TABLET ORAL
Qty: 30 TABLET | Refills: 0 | Status: SHIPPED | OUTPATIENT
Start: 2024-05-09

## 2024-05-09 RX ORDER — DEXTROAMPHETAMINE SACCHARATE, AMPHETAMINE ASPARTATE, DEXTROAMPHETAMINE SULFATE AND AMPHETAMINE SULFATE 5; 5; 5; 5 MG/1; MG/1; MG/1; MG/1
TABLET ORAL
Qty: 120 TABLET | Refills: 0 | Status: SHIPPED | OUTPATIENT
Start: 2024-06-08 | End: 2024-07-09

## 2024-05-09 RX ORDER — DEXTROAMPHETAMINE SACCHARATE, AMPHETAMINE ASPARTATE, DEXTROAMPHETAMINE SULFATE AND AMPHETAMINE SULFATE 5; 5; 5; 5 MG/1; MG/1; MG/1; MG/1
TABLET ORAL
Qty: 120 TABLET | Refills: 0 | Status: SHIPPED | OUTPATIENT
Start: 2024-07-09 | End: 2024-08-09

## 2024-05-09 RX ORDER — DEXTROAMPHETAMINE SACCHARATE, AMPHETAMINE ASPARTATE, DEXTROAMPHETAMINE SULFATE AND AMPHETAMINE SULFATE 5; 5; 5; 5 MG/1; MG/1; MG/1; MG/1
TABLET ORAL
Qty: 120 TABLET | Refills: 0 | Status: SHIPPED | OUTPATIENT
Start: 2024-05-09 | End: 2024-06-08

## 2024-05-09 NOTE — TELEPHONE ENCOUNTER
Pt is requesting 3 month supply of adderall    Adderall 20 mg  Filled 4-11-24  Qty 120  0 refills  No future appointments.  LOV 3-12-24 SM    Alprazolam 0.25 mg  Filled 6-7-23  Qty 30  0 refills  No future appointments.  LOV 12-11-23 SM

## 2024-08-05 DIAGNOSIS — F98.8 ATTENTION DEFICIT DISORDER (ADD) WITHOUT HYPERACTIVITY: ICD-10-CM

## 2024-08-06 NOTE — TELEPHONE ENCOUNTER
Pt is requesting 3 months supply    Adderall 20 mg  Filled 7-9-24  Qty 120  0 refills  No future appointments.  LOV 12-11-23 SM

## 2024-08-07 RX ORDER — DEXTROAMPHETAMINE SACCHARATE, AMPHETAMINE ASPARTATE, DEXTROAMPHETAMINE SULFATE AND AMPHETAMINE SULFATE 5; 5; 5; 5 MG/1; MG/1; MG/1; MG/1
TABLET ORAL
Qty: 120 TABLET | Refills: 0 | OUTPATIENT
Start: 2024-08-07 | End: 2024-09-05

## 2024-09-06 DIAGNOSIS — F41.9 ANXIETY: ICD-10-CM

## 2024-09-06 RX ORDER — ALPRAZOLAM 0.25 MG
TABLET ORAL
Qty: 30 TABLET | Refills: 0 | Status: SHIPPED | OUTPATIENT
Start: 2024-09-06

## 2024-10-04 DIAGNOSIS — F98.8 ATTENTION DEFICIT DISORDER, UNSPECIFIED TYPE: ICD-10-CM

## 2024-10-04 RX ORDER — DEXTROAMPHETAMINE SACCHARATE, AMPHETAMINE ASPARTATE, DEXTROAMPHETAMINE SULFATE AND AMPHETAMINE SULFATE 5; 5; 5; 5 MG/1; MG/1; MG/1; MG/1
40 TABLET ORAL 2 TIMES DAILY
Qty: 120 TABLET | Refills: 0 | Status: CANCELLED | OUTPATIENT
Start: 2024-10-04

## 2024-10-07 RX ORDER — DEXTROAMPHETAMINE SACCHARATE, AMPHETAMINE ASPARTATE, DEXTROAMPHETAMINE SULFATE AND AMPHETAMINE SULFATE 5; 5; 5; 5 MG/1; MG/1; MG/1; MG/1
TABLET ORAL
Qty: 120 TABLET | Refills: 0 | Status: SHIPPED | OUTPATIENT
Start: 2024-10-07 | End: 2024-11-06

## 2024-10-07 RX ORDER — DEXTROAMPHETAMINE SACCHARATE, AMPHETAMINE ASPARTATE, DEXTROAMPHETAMINE SULFATE AND AMPHETAMINE SULFATE 5; 5; 5; 5 MG/1; MG/1; MG/1; MG/1
TABLET ORAL
Qty: 120 TABLET | Refills: 0 | Status: SHIPPED | OUTPATIENT
Start: 2024-11-07 | End: 2024-12-07

## 2024-10-07 RX ORDER — DEXTROAMPHETAMINE SACCHARATE, AMPHETAMINE ASPARTATE, DEXTROAMPHETAMINE SULFATE AND AMPHETAMINE SULFATE 5; 5; 5; 5 MG/1; MG/1; MG/1; MG/1
TABLET ORAL
Qty: 120 TABLET | Refills: 0 | Status: SHIPPED | OUTPATIENT
Start: 2024-12-08 | End: 2025-01-07

## 2024-10-07 NOTE — TELEPHONE ENCOUNTER
Pt is requesting 3 months worth of medication please    Adderall 20 mg  Filled 8-26-24  Qty 120  0 refills  No future appointments.  LOV 8-26-24 SM

## 2024-11-23 DIAGNOSIS — E03.9 HYPOTHYROIDISM, UNSPECIFIED TYPE: ICD-10-CM

## 2024-11-23 DIAGNOSIS — K21.9 GASTROESOPHAGEAL REFLUX DISEASE WITHOUT ESOPHAGITIS: ICD-10-CM

## 2024-11-23 RX ORDER — PANTOPRAZOLE SODIUM 40 MG/1
40 TABLET, DELAYED RELEASE ORAL 2 TIMES DAILY
Qty: 180 TABLET | Refills: 0 | Status: SHIPPED | OUTPATIENT
Start: 2024-11-23

## 2024-11-23 RX ORDER — LEVOTHYROXINE SODIUM 112 UG/1
112 TABLET ORAL
Qty: 90 TABLET | Refills: 0 | Status: SHIPPED | OUTPATIENT
Start: 2024-11-23

## 2024-12-17 DIAGNOSIS — E03.9 HYPOTHYROIDISM, UNSPECIFIED TYPE: ICD-10-CM

## 2024-12-17 NOTE — TELEPHONE ENCOUNTER
MCM sent to pt. Due for CPX and labs    Levothyroxine 112 mcg    Thyroid Medication Protocol [id number]/2024 08:41 AM   Protocol Details TSH in past 12 months      Filled 11-23-24  Qty 90  0 refills  No future appointments.  LOV 8-26-24 SM

## 2024-12-30 RX ORDER — LEVOTHYROXINE SODIUM 112 UG/1
112 TABLET ORAL
Qty: 90 TABLET | Refills: 0 | OUTPATIENT
Start: 2024-12-30

## 2024-12-31 DIAGNOSIS — F98.8 ATTENTION DEFICIT DISORDER, UNSPECIFIED TYPE: ICD-10-CM

## 2024-12-31 RX ORDER — DEXTROAMPHETAMINE SACCHARATE, AMPHETAMINE ASPARTATE, DEXTROAMPHETAMINE SULFATE AND AMPHETAMINE SULFATE 5; 5; 5; 5 MG/1; MG/1; MG/1; MG/1
40 TABLET ORAL 2 TIMES DAILY
Qty: 120 TABLET | Refills: 0 | Status: SHIPPED | OUTPATIENT
Start: 2024-12-31

## 2024-12-31 NOTE — TELEPHONE ENCOUNTER
Adderall 20 mg  Filled 12-8-24  Qty 120  0 refills  Future Appointments   Date Time Provider Department Center   1/7/2025  4:30 PM iGa Bowman APRN [facility] 8 [facility] [place]   1/23/2025  5:40 PM PF EULALIO RM2 PF [facility] 8-26-24

## 2025-01-07 ENCOUNTER — TELEPHONE (OUTPATIENT)
Dept: INTERNAL MEDICINE CLINIC | Facility: CLINIC | Age: 45
End: 2025-01-07

## 2025-01-07 ENCOUNTER — OFFICE VISIT (OUTPATIENT)
Dept: INTERNAL MEDICINE CLINIC | Facility: CLINIC | Age: 45
End: 2025-01-07
Payer: COMMERCIAL

## 2025-01-07 VITALS
HEIGHT: 63 IN | HEART RATE: 74 BPM | SYSTOLIC BLOOD PRESSURE: 124 MMHG | BODY MASS INDEX: 36.64 KG/M2 | DIASTOLIC BLOOD PRESSURE: 76 MMHG | WEIGHT: 206.81 LBS | TEMPERATURE: 97 F | RESPIRATION RATE: 16 BRPM | OXYGEN SATURATION: 98 %

## 2025-01-07 DIAGNOSIS — F90.9 ATTENTION DEFICIT HYPERACTIVITY DISORDER (ADHD), UNSPECIFIED ADHD TYPE: ICD-10-CM

## 2025-01-07 DIAGNOSIS — Z12.11 ENCOUNTER FOR SCREENING FECAL OCCULT BLOOD TESTING: ICD-10-CM

## 2025-01-07 DIAGNOSIS — K21.9 GASTROESOPHAGEAL REFLUX DISEASE WITHOUT ESOPHAGITIS: ICD-10-CM

## 2025-01-07 DIAGNOSIS — E78.5 HYPERLIPIDEMIA, UNSPECIFIED HYPERLIPIDEMIA TYPE: ICD-10-CM

## 2025-01-07 DIAGNOSIS — E03.9 HYPOTHYROIDISM, UNSPECIFIED TYPE: ICD-10-CM

## 2025-01-07 DIAGNOSIS — Z00.00 ROUTINE GENERAL MEDICAL EXAMINATION AT A HEALTH CARE FACILITY: Primary | ICD-10-CM

## 2025-01-07 LAB
ABSOLUTE BASOPHILS: 0.1
ABSOLUTE EOSINOPHILS: 0.4
ABSOLUTE LYMPHOCYTES: 3.3
ABSOLUTE MONOCYTES: 0.5
ALBUMIN: 4.6 G/DL
ALKALINE PHOSPHATASE: 93
ALT: 21
AST: 18
BASOPHIL %: 1.1
BILIRUBIN, TOTAL: 0.5 MG/DL
BUN/CREATININE RATIO: 19.1
BUN: 17 MG/DL (ref 7–22)
CALCIUM: 9.9 MG/DL
CHLORIDE: 106
CHOL/HDL RATIO: 3
CHOLESTEROL, TOTAL: 229 MG/DL
CO2: 25
CREATININE: 0.9 MG/DL
EOSINOPHIL %: 5.1
FREE T4: 1.1
GLUCOSE: 82
HDL CHOLESTEROL: 69 MG/DL
HEMATOCRIT: 90.7 %
HEMOGLOBIN: 14.3
LDL CHOLESTEROL: 129 MG/DL (ref ?–130)
LYMPHOCYTE %: 44.3
MCH: 33.7 PG
MCHC: 14 G/DL
MCV: 30.6 FL
MONOCYTE %: 7
MPV: 8
NEUTROPHIL %: 42.9
NEUTROPHIL ABSOLUTE: 3.2
POTASSIUM: 4.1
RBC: 4.68 X 10-6/UL
RDW: 359 %
SODIUM: 142
THYROXINE (T4): 11.2 ΜG/DL
TOTAL PROTEIN: 7.1
TRIGLYCERIDES: 155 MG/DL
TSH: 9.08 UIU/ML
VITAMIN D, 25-HYDROXY: 35.6 NG/ML
WBC: 7.4 /HPF

## 2025-01-07 PROCEDURE — 99213 OFFICE O/P EST LOW 20 MIN: CPT | Performed by: FAMILY MEDICINE

## 2025-01-07 PROCEDURE — 99396 PREV VISIT EST AGE 40-64: CPT | Performed by: FAMILY MEDICINE

## 2025-01-07 RX ORDER — LEVOTHYROXINE SODIUM 125 UG/1
125 TABLET ORAL
Qty: 30 TABLET | Refills: 1 | Status: SHIPPED | OUTPATIENT
Start: 2025-01-07

## 2025-01-07 RX ORDER — PITAVASTATIN CALCIUM 2.09 MG/1
2 TABLET, FILM COATED ORAL DAILY
Qty: 90 TABLET | Refills: 1 | Status: SHIPPED | OUTPATIENT
Start: 2025-01-07

## 2025-01-07 NOTE — TELEPHONE ENCOUNTER
Received lab results, entered in epic, placed in SM basket.    Pt has upcoming appt w/ you tonight

## 2025-01-07 NOTE — PROGRESS NOTES
HPI:   Pia Evans is a 44 year old female who presents for a complete physical exam. Symptoms: denies discharge, itching, burning or dysuria, flow is 1-2 days, menses irregular . Patient complains of none.   Regular SBE- yes,Sexually active- yes,  Contraception- IUD. STD history- none    Patient presents for recheck of her ADHD.  Patient has been using the stimulant medication, Adderall 20 mg two tabs (40 mg) twice a day. Pt states sometimes when she is home she may not take the afternoon dose. Patient was last seen 8/2024.  Medication changes at that time: none.  Patient feels the medication still helps her focus.  Pt has been taking medications as instructed, no medication side effects.  Patient's appetite is good. Patient denies headaches, tics or insomnia.  Patient denies any chest pain, palpitations, or dyspnea.  Patient denies any depressive symptoms or anxiety.     Pt is here to recheck her GERD condition. The medication has been controlling her symptoms. Pt has gone off the medication and symptoms returned, but then resolved once medication was restarted. Pt needs a refill. Pt has been avoiding fluids and food two to three hours before bed. Pt has been avoiding inciting foods.      Patient also here for recheck on  hyperlipidemia.   Currently asymptomatic, no chest pains, jaw pains, arm pains. No skin lesions.  No SOB on exertion or rest.  Patient denies any myalgias or arthralgias on the medication. Pt has been following a low fat diet and has not been exercising but plans on getting outside and walking once the weather improves  Current lipid treatment: Pitavastatin        Pt had a history of hypothyroidism and here to recheck. Has been tolerating the medication well. TSH up. Will increase Pt's Levothyroxine dose to  125 mcg daily.Denies any shakiness, palpitations, increased BM's. Pt up 6 pounds since last year. Denies any fatigue, skin or hair issues.    Labs discussed with Pt. Lipids up a little.  Pt will continue her medication and working on her diet and exercise. Recheck lab in 6 months  RBC and HCT are a little low. Pt will go back to her multivitamin with iron daily. Recheck lab in 6 months.  TSH up, will increase levothyroxine to 125 mcg daily and have her recheck TSH in 6-8 weeks.    Screening:  Immunization History   Administered Date(s) Administered    Covid-19 Vaccine Moderna 100 mcg/0.5 ml 01/26/2021, 02/26/2021    Covid-19 Vaccine Moderna 50 Mcg/0.25 Ml 01/14/2022    Influenza 11/13/2007, 12/02/2008, 10/25/2010, 10/27/2017, 11/07/2018, 10/28/2019, 09/26/2020    TDAP 12/11/2023      Menarche- 10 yr  PAP- 8/8/22  Any abnormal pap smears- none  Pregnancies- 3, Live births- 3  Mammogram-  3/27/23   Any breast cancer- none, or any gynecological cancer- sister endometrial cancer, any cancers none  Labs- partial 11/30/23  DEXA over 65yr- n/a  Flu shot-  declines  Colon- DUE  Glasses/contacts- had lasik done 11/11/24. last exam- 11/2024  Dental visits- 10/2024    Immunization History   Administered Date(s) Administered    Covid-19 Vaccine Moderna 100 mcg/0.5 ml 01/26/2021, 02/26/2021    Covid-19 Vaccine Moderna 50 Mcg/0.25 Ml 01/14/2022    Influenza 11/13/2007, 12/02/2008, 10/25/2010, 10/27/2017, 11/07/2018, 10/28/2019, 09/26/2020    TDAP 12/11/2023     Wt Readings from Last 6 Encounters:   01/07/25 206 lb 12.8 oz (93.8 kg)   08/26/24 201 lb (91.2 kg)   03/12/24 202 lb 12.8 oz (92 kg)   12/11/23 198 lb (89.8 kg)   05/08/23 202 lb 6.4 oz (91.8 kg)   03/13/23 202 lb (91.6 kg)     Body mass index is 36.63 kg/m².     Lab Results   Component Value Date    GLU 80 12/15/2014    GLU 85 04/26/2012     (H) 04/03/2012     Lab Results   Component Value Date    CHOLEST 229 01/02/2025    CHOLEST 187 11/30/2023    CHOLEST 200 08/22/2023     Lab Results   Component Value Date    HDL 69 01/02/2025    HDL 62 11/30/2023    HDL 67 08/22/2023     Lab Results   Component Value Date     01/02/2025      11/30/2023     08/22/2023     Lab Results   Component Value Date    AST 18 01/02/2025    AST 15 11/30/2023    AST 22 08/22/2023     Lab Results   Component Value Date    ALT 21 01/02/2025    ALT 17 11/30/2023    ALT 20 08/22/2023       Current Outpatient Medications   Medication Sig Dispense Refill    amphetamine-dextroamphetamine (ADDERALL) 20 MG Oral Tab Take 2 tablets (40 mg total) by mouth in the morning and 2 tablets (40 mg total) before bedtime. 120 tablet 0    LEVOTHYROXINE 112 MCG Oral Tab TAKE 1 TABLET(112 MCG) BY MOUTH BEFORE BREAKFAST 90 tablet 0    PANTOPRAZOLE 40 MG Oral Tab EC TAKE 1 TABLET(40 MG) BY MOUTH TWICE DAILY 180 tablet 0    ALPRAZOLAM 0.25 MG Oral Tab TAKE 1 TABLET BY MOUTH TWICE DAILY AS NEEDED 30 tablet 0    Pitavastatin Calcium 2 MG Oral Tab Take 2 mg by mouth daily. 90 tablet 0    Rizatriptan Benzoate (MAXALT) 10 MG Oral Tab Take one at the onset of your headache and repeat in 2 hours if needed. 27 tablet 0    valACYclovir HCl 500 MG Oral Tab as needed.        levonorgestrel 20 MCG/24HR Intrauterine IUD 20 mcg (1 each total) by Intrauterine route once.        Past Medical History:    Cervical polyp    Encounter for insertion of Mirena IUD    Encounter for screening for human papillomavirus (HPV)    NEGATIVE    General counseling for prescription of oral contraceptives    Normal delivery (HCC)    x3    Pap smear for cervical cancer screening    WNL    Thyroid disease      Past Surgical History:   Procedure Laterality Date    Abdominoplasty  05/2019    Lipo suction/tummy tuck    Implantable breast prosthesis  05/2019    breast implants    Insert intrauterine device      Lap sleeve gastrectomy  01/16/2018    Remove intrauterine device        Family History   Problem Relation Age of Onset    Diabetes Father     Ovarian Cancer Sister 50        estimate    Neurological Disorder Sister         migraine headache    Lipids Brother     Hypertension Paternal Grandmother     Psychiatric  Paternal Grandmother         stroke    Other (Other) Paternal Grandmother       Social History:   Social History     Socioeconomic History    Marital status:    Tobacco Use    Smoking status: Never    Smokeless tobacco: Never   Vaping Use    Vaping status: Never Used   Substance and Sexual Activity    Alcohol use: Yes     Comment: soc.    Drug use: No    Sexual activity: Yes     Partners: Male     Birth control/protection: I.U.D., Mirena   Other Topics Concern    Caffeine Concern Yes     Comment: 1 cup coffee in the am    Exercise Yes     Comment: 5x/week    Seat Belt Yes     Occ: . : yes. Children: 3.   Exercise:  2-3 times per week.  Diet: watches fats closely and low carb diet     REVIEW OF SYSTEMS:   GENERAL: feels well otherwise  SKIN: denies any unusual skin lesions  EYES:denies blurred vision or double vision  HEENT: denies nasal congestion, sinus pain or ST  LUNGS: denies shortness of breath with exertion  CARDIOVASCULAR: denies chest pain on exertion  GI: denies abdominal pain,+GERD  : denies dysuria, vaginal discharge or itching,periods irregular   MUSCULOSKELETAL: hx of back pain  NEURO: hx  migraine headaches  PSYCHE: +depression/ anxiety and ADD  HEMATOLOGIC: denies hx of anemia  ENDOCRINE: +thyroid history  ALL/ASTHMA: denies hx of allergy or asthma    EXAM:   /76 (BP Location: Left arm, Patient Position: Sitting, Cuff Size: adult)   Pulse 74   Temp 97.2 °F (36.2 °C) (Temporal)   Resp 16   Ht 5' 3\" (1.6 m)   Wt 206 lb 12.8 oz (93.8 kg)   SpO2 98%   BMI 36.63 kg/m²   Body mass index is 36.63 kg/m².   GENERAL: well developed, well nourished,in no apparent distress  SKIN: no rashes,no suspicious lesions  HEENT: atraumatic, normocephalic,ears and throat are clear  EYES:PERRLA, EOMI, normal optic disk,conjunctiva are clear  NECK: supple,no adenopathy,no bruits  CHEST: no chest tenderness  BREAST: declined has mammogram scheduled  LUNGS: clear to  auscultation  CARDIO: RRR without murmur  GI: good BS's,no masses, HSM or tenderness  :deferred  MUSCULOSKELETAL: back is not tender,FROM of the back  EXTREMITIES: no cyanosis, clubbing or edema  NEURO: Oriented times three,cranial nerves are intact,motor and sensory are grossly intact  PSYCH:  Speech, mood and affect are appropriate    ASSESSMENT AND PLAN:   Pia Evans is a 44 year old female who presents for a complete physical exam.  Order put in for mammogram. Labs up to date and discussed with Pt.. Pt referred for screening colonoscopy but prefers to do FIT card instead.Pt' s weight is Body mass index is 36.63 kg/m²., recommended low fat diet and aerobic exercise 30 minutes three times weekly.  The patient indicates understanding of these issues and agrees to the plan.    1. Encounter for screening fecal occult blood testing  - Occult Blood, Fecal, FIT Immunoassay; Future    2. Hyperlipidemia, unspecified hyperlipidemia type  Pt to continue their medication, increase exercise,  choose better fats,  increase fiber and avoid processed foods.  - Pitavastatin Calcium 2 MG Oral Tab; Take 2 mg by mouth daily.  Dispense: 90 tablet; Refill: 1    3. Hypothyroidism, unspecified type  Increase Levothyroxine to 125 mcg daily  Recheck TSH in 6-8 weeks.  - levothyroxine 125 MCG Oral Tab; Take 1 tablet (125 mcg total) by mouth before breakfast.  Dispense: 30 tablet; Refill: 1    4. Attention deficit hyperactivity disorder (ADHD), unspecified ADHD type  Stable, continue current dose of adderall. Pt will call when needs next refill    5. Routine general medical examination at a health care facility  The patient is asked to return for CPX in one year.    6. Gastroesophageal reflux disease without esophagitis  - stable, continue medication. Avoid trigger foods.    Encounter Diagnoses   Name Primary?    Encounter for screening fecal occult blood testing Yes    Hyperlipidemia, unspecified hyperlipidemia type        Orders  Placed This Encounter   Procedures    Occult Blood, Fecal, FIT Immunoassay       Meds & Refills for this Visit:  Requested Prescriptions     Signed Prescriptions Disp Refills    Pitavastatin Calcium 2 MG Oral Tab 90 tablet 1     Sig: Take 2 mg by mouth daily.    levothyroxine 125 MCG Oral Tab 30 tablet 1     Sig: Take 1 tablet (125 mcg total) by mouth before breakfast.       Imaging & Consults:  None

## 2025-01-22 ENCOUNTER — PATIENT MESSAGE (OUTPATIENT)
Dept: INTERNAL MEDICINE CLINIC | Facility: CLINIC | Age: 45
End: 2025-01-22

## 2025-01-22 RX ORDER — TIRZEPATIDE 2.5 MG/.5ML
2.5 INJECTION, SOLUTION SUBCUTANEOUS WEEKLY
Qty: 2 ML | Refills: 0 | Status: SHIPPED | OUTPATIENT
Start: 2025-01-22 | End: 2025-02-13

## 2025-01-22 NOTE — TELEPHONE ENCOUNTER
Patient requesting prescription for Zepbound.       Last OV - 1/7    Order pended, please sign if appropriate.

## 2025-01-23 ENCOUNTER — TELEPHONE (OUTPATIENT)
Dept: INTERNAL MEDICINE CLINIC | Facility: CLINIC | Age: 45
End: 2025-01-23

## 2025-01-23 ENCOUNTER — HOSPITAL ENCOUNTER (OUTPATIENT)
Dept: MAMMOGRAPHY | Age: 45
Discharge: HOME | End: 2025-01-23
Attending: FAMILY MEDICINE
Payer: COMMERCIAL

## 2025-01-23 DIAGNOSIS — Z12.31 ENCOUNTER FOR SCREENING MAMMOGRAM FOR MALIGNANT NEOPLASM OF BREAST: ICD-10-CM

## 2025-01-23 PROCEDURE — 77063 BREAST TOMOSYNTHESIS BI: CPT | Performed by: FAMILY MEDICINE

## 2025-01-23 PROCEDURE — 77067 SCR MAMMO BI INCL CAD: CPT | Performed by: FAMILY MEDICINE

## 2025-01-23 NOTE — TELEPHONE ENCOUNTER
Called [facility], spoke to Cristy.     Zepbound is not covered under plan benefits  Asked to see if wegovy is covered by any chance, is not covered under plan benefits.    CHAGO sent to pt to notify.     NITZA

## 2025-02-04 DIAGNOSIS — F98.8 ATTENTION DEFICIT DISORDER, UNSPECIFIED TYPE: ICD-10-CM

## 2025-02-05 RX ORDER — DEXTROAMPHETAMINE SACCHARATE, AMPHETAMINE ASPARTATE, DEXTROAMPHETAMINE SULFATE AND AMPHETAMINE SULFATE 5; 5; 5; 5 MG/1; MG/1; MG/1; MG/1
2 TABLET ORAL 2 TIMES DAILY
Qty: 120 TABLET | Refills: 0 | Status: SHIPPED | OUTPATIENT
Start: 2025-02-05

## 2025-02-05 RX ORDER — DEXTROAMPHETAMINE SACCHARATE, AMPHETAMINE ASPARTATE, DEXTROAMPHETAMINE SULFATE AND AMPHETAMINE SULFATE 5; 5; 5; 5 MG/1; MG/1; MG/1; MG/1
40 TABLET ORAL 2 TIMES DAILY
Qty: 120 TABLET | Refills: 0 | OUTPATIENT
Start: 2025-02-05

## 2025-02-05 NOTE — TELEPHONE ENCOUNTER
Pt is requesting 3 months supply sent to [place] please.     Adderall 20 mg  Filled 12-31-24  Qty 120  0 refills  No future appointments.  LOV 1-7-24 SM

## 2025-03-04 ENCOUNTER — TELEPHONE (OUTPATIENT)
Dept: INTERNAL MEDICINE CLINIC | Facility: CLINIC | Age: 45
End: 2025-03-04

## 2025-03-04 NOTE — TELEPHONE ENCOUNTER
Received Rx for semaglutide (compunded) for 0.25 mL (25 units).  Faxed over and placed in accordion on desk

## 2025-03-06 DIAGNOSIS — F98.8 ATTENTION DEFICIT DISORDER, UNSPECIFIED TYPE: ICD-10-CM

## 2025-03-06 RX ORDER — DEXTROAMPHETAMINE SACCHARATE, AMPHETAMINE ASPARTATE, DEXTROAMPHETAMINE SULFATE AND AMPHETAMINE SULFATE 5; 5; 5; 5 MG/1; MG/1; MG/1; MG/1
TABLET ORAL
Qty: 120 TABLET | Refills: 0 | Status: SHIPPED | OUTPATIENT
Start: 2025-04-06 | End: 2025-05-06

## 2025-03-06 RX ORDER — DEXTROAMPHETAMINE SACCHARATE, AMPHETAMINE ASPARTATE, DEXTROAMPHETAMINE SULFATE AND AMPHETAMINE SULFATE 5; 5; 5; 5 MG/1; MG/1; MG/1; MG/1
TABLET ORAL
Qty: 120 TABLET | Refills: 0 | Status: SHIPPED | OUTPATIENT
Start: 2025-05-07 | End: 2025-05-28

## 2025-03-06 RX ORDER — DEXTROAMPHETAMINE SACCHARATE, AMPHETAMINE ASPARTATE, DEXTROAMPHETAMINE SULFATE AND AMPHETAMINE SULFATE 5; 5; 5; 5 MG/1; MG/1; MG/1; MG/1
TABLET ORAL
Qty: 120 TABLET | Refills: 0 | Status: SHIPPED | OUTPATIENT
Start: 2025-03-06 | End: 2025-04-05

## 2025-03-06 RX ORDER — DEXTROAMPHETAMINE SACCHARATE, AMPHETAMINE ASPARTATE, DEXTROAMPHETAMINE SULFATE AND AMPHETAMINE SULFATE 5; 5; 5; 5 MG/1; MG/1; MG/1; MG/1
2 TABLET ORAL 2 TIMES DAILY
Qty: 120 TABLET | Refills: 0 | Status: CANCELLED | OUTPATIENT
Start: 2025-03-06

## 2025-03-06 NOTE — TELEPHONE ENCOUNTER
Pt is requesting 3 month supply    Amphetamine dextroamphetamine 20 mg  Filled 2-5-25  Qty 120  0 refills  No future appointments.  LOV 1-7-25 SM

## 2025-03-21 DIAGNOSIS — E03.9 HYPOTHYROIDISM, UNSPECIFIED TYPE: ICD-10-CM

## 2025-03-21 RX ORDER — LEVOTHYROXINE SODIUM 125 UG/1
125 TABLET ORAL
Qty: 30 TABLET | Refills: 0 | Status: SHIPPED | OUTPATIENT
Start: 2025-03-21

## 2025-03-21 NOTE — TELEPHONE ENCOUNTER
MCM sent to pt to complete Thyroid labs.    Levothyroxine 125 mcg  Filled 1-7-25  Qty 30  1 refill  No future appointments.  LOV 1-7-25 SM

## 2025-04-25 DIAGNOSIS — E03.9 HYPOTHYROIDISM, UNSPECIFIED TYPE: ICD-10-CM

## 2025-04-25 DIAGNOSIS — F98.8 ATTENTION DEFICIT DISORDER, UNSPECIFIED TYPE: ICD-10-CM

## 2025-04-26 RX ORDER — LEVOTHYROXINE SODIUM 125 UG/1
125 TABLET ORAL
Qty: 30 TABLET | Refills: 0 | Status: SHIPPED | OUTPATIENT
Start: 2025-04-26

## 2025-04-26 RX ORDER — DEXTROAMPHETAMINE SACCHARATE, AMPHETAMINE ASPARTATE, DEXTROAMPHETAMINE SULFATE AND AMPHETAMINE SULFATE 5; 5; 5; 5 MG/1; MG/1; MG/1; MG/1
TABLET ORAL
Qty: 120 TABLET | Refills: 0 | OUTPATIENT
Start: 2025-04-26 | End: 2025-05-25

## 2025-04-26 NOTE — TELEPHONE ENCOUNTER
Too early to refill adderall 20 mg  Has 1 month of file    Levothyroxine 125 mcg  Filled 3-21-25  Qty 30  0 refills  No future appointments.  LOV 1-7-25 SM  Due for repeat lab work

## 2025-05-07 ENCOUNTER — TELEPHONE (OUTPATIENT)
Dept: ORTHOPEDICS CLINIC | Facility: CLINIC | Age: 45
End: 2025-05-07

## 2025-05-07 DIAGNOSIS — M79.642 BILATERAL HAND PAIN: Primary | ICD-10-CM

## 2025-05-07 DIAGNOSIS — M79.641 BILATERAL HAND PAIN: Primary | ICD-10-CM

## 2025-05-07 NOTE — TELEPHONE ENCOUNTER
Patient is scheduled for BHARGAVI hand pain.    Future Appointments   Date Time Provider Department Center   5/12/2025  8:00 AM Gregor Perez,  [facility] [address]     Patient is aware to arrive 15-20 mins prior for possible imaging.    Please advise if imaging is needed.

## 2025-05-08 NOTE — TELEPHONE ENCOUNTER
X-Ray ordered and scheduled.     Future Appointments   Date Time Provider Department Center   5/12/2025  7:45 AM PF XR LL 1 PF XRAY [place]   5/12/2025  8:00 AM Gregor Perez DO [facility] [address]

## 2025-05-12 ENCOUNTER — OFFICE VISIT (OUTPATIENT)
Facility: CLINIC | Age: 45
End: 2025-05-12
Payer: COMMERCIAL

## 2025-05-12 ENCOUNTER — HOSPITAL ENCOUNTER (OUTPATIENT)
Dept: GENERAL RADIOLOGY | Age: 45
Discharge: HOME | End: 2025-05-12
Attending: FAMILY MEDICINE
Payer: COMMERCIAL

## 2025-05-12 VITALS — WEIGHT: 206 LBS | HEIGHT: 63 IN | BODY MASS INDEX: 36.5 KG/M2

## 2025-05-12 DIAGNOSIS — S44.92XA NEURAPRAXIA OF LEFT UPPER EXTREMITY, INITIAL ENCOUNTER: ICD-10-CM

## 2025-05-12 DIAGNOSIS — G56.01 CARPAL TUNNEL SYNDROME ON RIGHT: Primary | ICD-10-CM

## 2025-05-12 DIAGNOSIS — M79.641 BILATERAL HAND PAIN: ICD-10-CM

## 2025-05-12 DIAGNOSIS — S44.91XA NEURAPRAXIA OF RIGHT UPPER EXTREMITY, INITIAL ENCOUNTER: ICD-10-CM

## 2025-05-12 DIAGNOSIS — G56.02 CARPAL TUNNEL SYNDROME ON LEFT: ICD-10-CM

## 2025-05-12 DIAGNOSIS — M79.642 BILATERAL HAND PAIN: ICD-10-CM

## 2025-05-12 DIAGNOSIS — G56.21 CUBITAL TUNNEL SYNDROME ON RIGHT: ICD-10-CM

## 2025-05-12 DIAGNOSIS — G56.22 CUBITAL TUNNEL SYNDROME ON LEFT: ICD-10-CM

## 2025-05-12 PROCEDURE — 99204 OFFICE O/P NEW MOD 45 MIN: CPT | Performed by: FAMILY MEDICINE

## 2025-05-12 PROCEDURE — 73130 X-RAY EXAM OF HAND: CPT | Performed by: FAMILY MEDICINE

## 2025-05-12 RX ORDER — METHYLPREDNISOLONE 4 MG/1
TABLET ORAL
Qty: 1 EACH | Refills: 0 | Status: SHIPPED | OUTPATIENT
Start: 2025-05-12

## 2025-05-12 RX ORDER — GABAPENTIN 300 MG/1
300 CAPSULE ORAL NIGHTLY
Qty: 30 CAPSULE | Refills: 1 | Status: SHIPPED | OUTPATIENT
Start: 2025-05-12 | End: 2025-07-11

## 2025-05-12 NOTE — H&P
Sports Medicine Clinic Note    Subjective:    Chief Complaint: Bilateral hand numbness and tingling    History: 44-year-old RHD female presents with chronic intermittent numbness and tingling in the hands, predominantly involving three fingers, with occasional involvement of others. Symptoms have persisted for years and are exacerbated by repetitive activities like typing, painting, and home projects. She reports an achy pain near the elbow, particularly around the ulnar nerve distribution \"funny bone\" area, and occasional difficulty with  strength during flare-ups. She denies muscle atrophy or persistent weakness. Symptom relief has been achieved to some extent with wrist bracing at night, ibuprofen, ice, and gabapentin during flare-ups. Her work in medical billing involves prolonged typing, and she has recently been engaged in rebuilding a [place].    Objective:    Bilateral Upper Extremity Examination:    Inspection: No muscle atrophy or deformity noted. No swelling or erythema.  Palpation: Mild tenderness over the medial epicondyle bilaterally. No significant tenderness in the wrists.  Range of Motion: Full active and passive range of motion in elbows and wrists without restriction.  Neurovascular: Intact radial and ulnar pulses. No obvious motor deficits on gross testing.  Special Tests: Positive Phalen’s and Tinel’s tests at the carpal tunnel bilaterally. Tinel’s sign also positive at the cubital tunnels. No Froment’s sign noted. No intrinsic hand muscle wasting observed.    Diagnostic Tests:    Radiographs of bilateral hands demonstrated no fracture, dislocation, or significant arthritis.    Assessment:    Chronic bilateral carpal tunnel syndrome  Possible bilateral cubital tunnel syndrome    Plan:    Additional Workup: Order EMG/NCS of bilateral upper extremities to evaluate the extent and severity of median and ulnar neuropathy.  Therapy: Recommend ergonomic adjustments at work and during physical  activities. Continue nighttime wrist bracing. Consider elbow pads or positional modifications to reduce cubital tunnel compression.  Medications: Prescribe gabapentin for neuropathic pain during flare-ups. Short course of oral corticosteroids for acute symptom management.  Bracing/Casting: Continue wrist braces at night. Consider elbow sleeves or positional bracing if cubital symptoms worsen.  Procedures: Consider referral to hand surgery following EMG.  Activity Recommendations: Avoid prolonged elbow flexion and repetitive wrist-intensive activities. Use ergonomic keyboards and maintain neutral wrist position during typing or manual tasks.    Follow-Up: Tentatively scheduled once EMG testing is completed.      Gregor Perez DO, CAQSM   Primary Care Sports Medicine

## 2025-05-28 DIAGNOSIS — E03.9 HYPOTHYROIDISM, UNSPECIFIED TYPE: ICD-10-CM

## 2025-05-28 RX ORDER — LEVOTHYROXINE SODIUM 125 UG/1
125 TABLET ORAL
Qty: 30 TABLET | Refills: 0 | Status: SHIPPED | OUTPATIENT
Start: 2025-05-28

## 2025-05-30 ENCOUNTER — PATIENT MESSAGE (OUTPATIENT)
Facility: CLINIC | Age: 45
End: 2025-05-30

## 2025-06-02 NOTE — TELEPHONE ENCOUNTER
LOV- 05/12/2025 Bilateral hand numbness and tingling     Pt is asking for RX for her hand pain~    States the steroids helped immensely and she is looking for advice and recommendations.    Offered otc pain relief and wearing splints. Please advise if there is something else that may help with the pain until she can get tested for the EMG~    Thanks~

## 2025-06-18 ENCOUNTER — PATIENT MESSAGE (OUTPATIENT)
Dept: INTERNAL MEDICINE CLINIC | Facility: CLINIC | Age: 45
End: 2025-06-18

## 2025-06-20 NOTE — TELEPHONE ENCOUNTER
SM: Please see [facility] and advise regarding semaglutide and [facility]/options. TY

## 2025-06-30 ENCOUNTER — PATIENT MESSAGE (OUTPATIENT)
Dept: INTERNAL MEDICINE CLINIC | Facility: CLINIC | Age: 45
End: 2025-06-30

## 2025-06-30 ENCOUNTER — TELEPHONE (OUTPATIENT)
Dept: INTERNAL MEDICINE CLINIC | Facility: CLINIC | Age: 45
End: 2025-06-30

## 2025-06-30 DIAGNOSIS — E03.9 HYPOTHYROIDISM, UNSPECIFIED TYPE: Primary | ICD-10-CM

## 2025-06-30 LAB
ABSOLUTE BASOPHILS: 0.1
ABSOLUTE EOSINOPHILS: 0.2
ABSOLUTE LYMPHOCYTES: 2.9
ABSOLUTE MONOCYTES: 0.6
ALBUMIN: 4.5 G/DL
ALKALINE PHOSPHATASE: 93
ALT: 19
AST: 16
BASOPHIL %: 1
BILIRUBIN, TOTAL: 0.6 MG/DL
BUN/CREATININE RATIO: 25.6
BUN: 18 MG/DL (ref 7–22)
CALCIUM: 9.6 MG/DL
CHLORIDE: 104
CHOL/HDL RATIO: 4
CHOLESTEROL, TOTAL: 213 MG/DL
CO2: 25
CREATININE: 0.7 MG/DL
EGFR: >60
EOSINOPHIL %: 2.6
FREE T4: 1.4
GLUCOSE: 78
HDL CHOLESTEROL: 59 MG/DL
HEMATOCRIT: 44.8 %
HEMOGLOBIN: 14.8
LDL CHOLESTEROL: 123 MG/DL (ref ?–130)
LYMPHOCYTE %: 39.9
MCH: 29.9 PG
MCHC: 33 G/DL
MCV: 90.6 FL
MONOCYTE %: 8
NEUTROPHIL %: 48.6
NEUTROPHIL ABSOLUTE: 3.5
PLATELET COUNT: 349
POTASSIUM: 4.2
RBC: 4.95 X 10-6/UL
RDW: 14 %
SODIUM: 140
THYROXINE (T4): 13.8 ΜG/DL
TOTAL PROTEIN: 6.9
TRIGLYCERIDES: 157 MG/DL
TSH: 0.33 UIU/ML
VITAMIN D, 25-HYDROXY: 36.2 NG/ML
WBC: 7.2 /HPF

## 2025-07-01 RX ORDER — LEVOTHYROXINE SODIUM 112 UG/1
112 CAPSULE ORAL DAILY
Qty: 30 CAPSULE | Refills: 0 | Status: SHIPPED | OUTPATIENT
Start: 2025-07-01 | End: 2025-07-01 | Stop reason: ALTCHOICE

## 2025-07-01 RX ORDER — LEVOTHYROXINE SODIUM 112 UG/1
112 TABLET ORAL
Qty: 30 TABLET | Refills: 0 | OUTPATIENT
Start: 2025-07-01

## 2025-07-01 NOTE — TELEPHONE ENCOUNTER
Pharmacy called asked if pt can use tablets in stead of capsules.  Per  that is fine.    Rx updated.

## 2025-07-03 DIAGNOSIS — E78.5 HYPERLIPIDEMIA, UNSPECIFIED HYPERLIPIDEMIA TYPE: ICD-10-CM

## 2025-07-03 DIAGNOSIS — F41.9 ANXIETY: ICD-10-CM

## 2025-07-03 RX ORDER — ALPRAZOLAM 0.25 MG
TABLET ORAL
Qty: 30 TABLET | Refills: 0 | Status: SHIPPED | OUTPATIENT
Start: 2025-07-03 | End: 2025-07-03

## 2025-07-03 RX ORDER — PITAVASTATIN CALCIUM 2.09 MG/1
2 TABLET, FILM COATED ORAL DAILY
Qty: 90 TABLET | Refills: 1 | Status: SHIPPED | OUTPATIENT
Start: 2025-07-03

## 2025-07-03 RX ORDER — PITAVASTATIN CALCIUM 2.09 MG/1
2 TABLET, FILM COATED ORAL DAILY
Qty: 90 TABLET | Refills: 1 | Status: SHIPPED | OUTPATIENT
Start: 2025-07-03 | End: 2025-07-03

## 2025-07-03 RX ORDER — ALPRAZOLAM 0.25 MG
TABLET ORAL
Qty: 30 TABLET | Refills: 0 | Status: SHIPPED | OUTPATIENT
Start: 2025-07-03

## 2025-07-03 NOTE — TELEPHONE ENCOUNTER
Alprazolam 0.25 mg  Filled 9-6-24  Qty 30  0 refills  No future appointments.  LOV 1-7-25 SM    Pitavastatin 2 mg  Filled 1-7-25  Qty 90  1 refill  No future appointments.  LOV 1-7-25 SM  Labs 6-26-25 Lipid

## 2025-08-02 ENCOUNTER — PATIENT MESSAGE (OUTPATIENT)
Dept: INTERNAL MEDICINE CLINIC | Facility: CLINIC | Age: 45
End: 2025-08-02

## 2025-08-14 RX ORDER — LEVOTHYROXINE SODIUM 112 UG/1
112 TABLET ORAL
Qty: 30 TABLET | Refills: 0 | Status: SHIPPED | OUTPATIENT
Start: 2025-08-14

## (undated) NOTE — MR AVS SNAPSHOT
After Visit Summary   3/4/2020    Sahara Serna    MRN: [id number]           Visit Information     Date & Time  3/4/2020  2:00 PM Provider  YAW Rojas Department  [facility] [address], [address] [address] [facility].  Phone  [phone] THINPREP PAP WITH HPV REFLEX REQUEST B [[id number] CUSTOM]     THINPREP PAP WITH HPV REFLEX REQUEST [[id number] CUSTOM]     Future Labs/Procedures Expected by Expires    EULLAIO SCREENING JENNIFER DAHL (CPT=77067) [54942 CPT(R)]  3/4/2020 (Approximate) 3/4/2021     Treatment for mild illness or injury that does not require immediate attention.  Average cost  $70*   [facility]  Monday – Friday  8:00 am – 8:00 pm   Saturday – Sunday  8:00 am – 4:00 pm    [facility]  Primary Care

## (undated) NOTE — LETTER
09/17/21        [address]      Dear Mark Guerrero,    [facility] records indicate that you have outstanding lab work and or testing that was ordered for you and has not yet been completed:  Orders Placed This Encounter

## (undated) NOTE — MR AVS SNAPSHOT
After Visit Summary   4/8/2021    Kalyani Bishop    MRN: [id number]           Visit Information     Date & Time  4/8/2021 12:15 PM Provider  YAW Carranza Department  [facility] [address], [address] [address] [place] [facility].  Phone  [phone] REQUEST B [id number] CUSTOM]     THINPREP PAP WITH HPV REFLEX REQUEST [[id number] CUSTOM]     Future Labs/Procedures Expected by Expires    THINPREP PAP WITH HPV REFLEX REQUEST B [[id number] CUSTOM]  4/8/2021 4/8/2022      Follow-up Instructions    Return in about illness   or injury that are   non-life-threatening.   Also available by appointment     Average cost  $70*       [address]   Monday – Friday  10:00 am – 10:00 pm   Saturday –

## (undated) NOTE — MR AVS SNAPSHOT
[place]  [address] [facility] [address]  [address] [facility] [id number] [phone]               Thank you for choosing us for your health care visit with Jeniffer Mcgill NP.   We are glad to serve you and happy to provide you with this sum Take 2 tablets in the AM and 1  1/2 tablets every PM   What changed: You were already taking a medication with the same name, and this prescription was added. Make sure you understand how and when to take each.    Commonly known as:  ADDYANCI [employer] You can get these medications from any pharmacy     Bring a paper prescription for each of these medications    - amphetamine-dextroamphetamine 20 MG Tabs  - amphetamine-dextroamphetamine 20 MG Tabs  - amphetamine-dextroamphetamine 20 MG Tabs  - hydrocodon Start activities slowly and build up over time Do what you like   Get your heart pumping – brisk walking, biking, swimming Even 10 minute increments are effective and add up over the week   2 ½ hours per week – spread out over time Use a kehinde to keep you

## (undated) NOTE — LETTER
Pia Evans, :1980    CONSENT FOR PROCEDURE/SEDATION    1. I authorize the performance upon [employer]  the following: IUD Removal and re insertion of Mirena IUD. Cervical polypectomy    2.  I authorize Dr. Geri Leach, APN (and wh Relationship to patient: ____________________________________________    Witness: _________________________________________ Date:___________     Physician Signature: _______________________________ Date:___________

## (undated) NOTE — LETTER
2/10/2020      Pia N [address] [facility] [address]      RE: Appointment Needed for Continuous Medication Refill     Dear Jim Koch:    Your recent request for a medication refill has been sent to [facility].      Larkin Gaucher